# Patient Record
Sex: FEMALE | Race: BLACK OR AFRICAN AMERICAN
[De-identification: names, ages, dates, MRNs, and addresses within clinical notes are randomized per-mention and may not be internally consistent; named-entity substitution may affect disease eponyms.]

---

## 2020-02-04 ENCOUNTER — HOSPITAL ENCOUNTER (INPATIENT)
Dept: HOSPITAL 72 - 2E | Age: 50
LOS: 4 days | Discharge: HOME | DRG: 65 | End: 2020-02-08
Payer: COMMERCIAL

## 2020-02-04 VITALS — WEIGHT: 143 LBS | HEIGHT: 61 IN | BODY MASS INDEX: 27 KG/M2

## 2020-02-04 VITALS — DIASTOLIC BLOOD PRESSURE: 101 MMHG | SYSTOLIC BLOOD PRESSURE: 157 MMHG

## 2020-02-04 DIAGNOSIS — M48.02: ICD-10-CM

## 2020-02-04 DIAGNOSIS — I16.1: ICD-10-CM

## 2020-02-04 DIAGNOSIS — I63.9: Primary | ICD-10-CM

## 2020-02-04 DIAGNOSIS — G45.9: ICD-10-CM

## 2020-02-04 DIAGNOSIS — E11.65: ICD-10-CM

## 2020-02-04 DIAGNOSIS — E83.42: ICD-10-CM

## 2020-02-04 DIAGNOSIS — E78.2: ICD-10-CM

## 2020-02-04 DIAGNOSIS — F17.200: ICD-10-CM

## 2020-02-04 DIAGNOSIS — Z79.84: ICD-10-CM

## 2020-02-04 DIAGNOSIS — M50.21: ICD-10-CM

## 2020-02-04 DIAGNOSIS — I10: ICD-10-CM

## 2020-02-04 DIAGNOSIS — Z90.710: ICD-10-CM

## 2020-02-04 PROCEDURE — 36415 COLL VENOUS BLD VENIPUNCTURE: CPT

## 2020-02-04 PROCEDURE — 80048 BASIC METABOLIC PNL TOTAL CA: CPT

## 2020-02-04 PROCEDURE — 70551 MRI BRAIN STEM W/O DYE: CPT

## 2020-02-04 PROCEDURE — 70544 MR ANGIOGRAPHY HEAD W/O DYE: CPT

## 2020-02-04 PROCEDURE — 72141 MRI NECK SPINE W/O DYE: CPT

## 2020-02-04 PROCEDURE — 82962 GLUCOSE BLOOD TEST: CPT

## 2020-02-04 PROCEDURE — 70548 MR ANGIOGRAPHY NECK W/DYE: CPT

## 2020-02-04 PROCEDURE — 86140 C-REACTIVE PROTEIN: CPT

## 2020-02-04 PROCEDURE — 84100 ASSAY OF PHOSPHORUS: CPT

## 2020-02-04 PROCEDURE — 84443 ASSAY THYROID STIM HORMONE: CPT

## 2020-02-04 PROCEDURE — 93005 ELECTROCARDIOGRAM TRACING: CPT

## 2020-02-04 PROCEDURE — 83735 ASSAY OF MAGNESIUM: CPT

## 2020-02-04 PROCEDURE — 83036 HEMOGLOBIN GLYCOSYLATED A1C: CPT

## 2020-02-04 PROCEDURE — 80061 LIPID PANEL: CPT

## 2020-02-04 PROCEDURE — 80053 COMPREHEN METABOLIC PANEL: CPT

## 2020-02-04 PROCEDURE — 93306 TTE W/DOPPLER COMPLETE: CPT

## 2020-02-04 PROCEDURE — 85025 COMPLETE CBC W/AUTO DIFF WBC: CPT

## 2020-02-04 PROCEDURE — 85651 RBC SED RATE NONAUTOMATED: CPT

## 2020-02-04 PROCEDURE — 93880 EXTRACRANIAL BILAT STUDY: CPT

## 2020-02-04 PROCEDURE — 84484 ASSAY OF TROPONIN QUANT: CPT

## 2020-02-04 RX ADMIN — LISINOPRIL SCH MG: 20 TABLET ORAL at 09:00

## 2020-02-04 NOTE — NUR
NURSE NOTES:

Administered PRN Tylenol per MD orders, re-assessed the patient, patient no longer c/o 
headache the way she did before. BP also has decreased from previous reading. Patient is 
currently stable, all needs attended to, will continue to monitor.

## 2020-02-04 NOTE — NUR
NURSE NOTES:

Patient arrived to the unit on on a gurney accompanied by EMTs. Report was given by Cindy haddad RN from [facility] ED Department. Patient was able to transfer self from the gurney to the 
bed with no incident. Patient is awake, alert and responsive. Breathing regular and 
unlabored on room air with no s/s of SOB noted at this time. IV access in on the LAC, 20G, 
patent, intact, and saline locked. Patient is c/o of a headache of 5/10 on the scale of 
0-10. Will administer PRN medications after orders have been received by the primary MD. 
Placed patient on cardiac monitor, showing sinus rhythm. Belongings list reviewed and signed 
with the patient. Patient also brought home medications with her, medications taken and put 
away to give to onsite pharmacy in the morning once they arrive. Oriented patient to the 
room, patient is able to ambulate with a steady gait. Placed bed in lowest position, 
side-rails x2, breaks engaged, and call light is within reach at all times. Contacted Dr. Whitney for admitting orders, orders noted and carried out. All other needs attended to, 
patient remains stable, will continue to monitor.

## 2020-02-05 VITALS — SYSTOLIC BLOOD PRESSURE: 145 MMHG | DIASTOLIC BLOOD PRESSURE: 92 MMHG

## 2020-02-05 VITALS — SYSTOLIC BLOOD PRESSURE: 142 MMHG | DIASTOLIC BLOOD PRESSURE: 92 MMHG

## 2020-02-05 VITALS — SYSTOLIC BLOOD PRESSURE: 145 MMHG | DIASTOLIC BLOOD PRESSURE: 99 MMHG

## 2020-02-05 VITALS — DIASTOLIC BLOOD PRESSURE: 92 MMHG | SYSTOLIC BLOOD PRESSURE: 143 MMHG

## 2020-02-05 VITALS — SYSTOLIC BLOOD PRESSURE: 136 MMHG | DIASTOLIC BLOOD PRESSURE: 89 MMHG

## 2020-02-05 VITALS — DIASTOLIC BLOOD PRESSURE: 83 MMHG | SYSTOLIC BLOOD PRESSURE: 138 MMHG

## 2020-02-05 LAB
ADD MANUAL DIFF: NO
ALBUMIN SERPL-MCNC: 3.2 G/DL (ref 3.4–5)
ALBUMIN/GLOB SERPL: 0.9 {RATIO} (ref 1–2.7)
ALP SERPL-CCNC: 77 U/L (ref 46–116)
ALT SERPL-CCNC: 26 U/L (ref 12–78)
ANION GAP SERPL CALC-SCNC: 8 MMOL/L (ref 5–15)
AST SERPL-CCNC: 13 U/L (ref 15–37)
BASOPHILS NFR BLD AUTO: 1.2 % (ref 0–2)
BILIRUB SERPL-MCNC: 0.5 MG/DL (ref 0.2–1)
BUN SERPL-MCNC: 17 MG/DL (ref 7–18)
CALCIUM SERPL-MCNC: 8.6 MG/DL (ref 8.5–10.1)
CHLORIDE SERPL-SCNC: 103 MMOL/L (ref 98–107)
CHOLEST SERPL-MCNC: 213 MG/DL (ref ?–200)
CO2 SERPL-SCNC: 28 MMOL/L (ref 21–32)
CREAT SERPL-MCNC: 0.8 MG/DL (ref 0.55–1.3)
EOSINOPHIL NFR BLD AUTO: 1.5 % (ref 0–3)
ERYTHROCYTE [DISTWIDTH] IN BLOOD BY AUTOMATED COUNT: 11.6 % (ref 11.6–14.8)
GLOBULIN SER-MCNC: 3.7 G/DL
HCT VFR BLD CALC: 34.2 % (ref 37–47)
HDLC SERPL-MCNC: 25 MG/DL (ref 40–60)
HGB BLD-MCNC: 12.6 G/DL (ref 12–16)
LYMPHOCYTES NFR BLD AUTO: 37 % (ref 20–45)
MCV RBC AUTO: 91 FL (ref 80–99)
MONOCYTES NFR BLD AUTO: 7.7 % (ref 1–10)
NEUTROPHILS NFR BLD AUTO: 52.6 % (ref 45–75)
PHOSPHATE SERPL-MCNC: 3.5 MG/DL (ref 2.5–4.9)
PLATELET # BLD: 164 K/UL (ref 150–450)
POTASSIUM SERPL-SCNC: 3.7 MMOL/L (ref 3.5–5.1)
RBC # BLD AUTO: 3.74 M/UL (ref 4.2–5.4)
SODIUM SERPL-SCNC: 139 MMOL/L (ref 136–145)
TRIGL SERPL-MCNC: 840 MG/DL (ref 30–150)
WBC # BLD AUTO: 6.3 K/UL (ref 4.8–10.8)

## 2020-02-05 RX ADMIN — LISINOPRIL SCH MG: 20 TABLET ORAL at 09:16

## 2020-02-05 RX ADMIN — HYDROCHLOROTHIAZIDE SCH MG: 50 TABLET ORAL at 09:15

## 2020-02-05 RX ADMIN — INSULIN ASPART SCH UNITS: 100 INJECTION, SOLUTION INTRAVENOUS; SUBCUTANEOUS at 06:24

## 2020-02-05 RX ADMIN — METFORMIN HYDROCHLORIDE SCH MG: 500 TABLET ORAL at 17:02

## 2020-02-05 RX ADMIN — HEPARIN SODIUM SCH UNITS: 5000 INJECTION INTRAVENOUS; SUBCUTANEOUS at 21:03

## 2020-02-05 RX ADMIN — INSULIN ASPART SCH UNITS: 100 INJECTION, SOLUTION INTRAVENOUS; SUBCUTANEOUS at 13:07

## 2020-02-05 RX ADMIN — DIPHENHYDRAMINE HYDROCHLORIDE PRN MG: 25 CAPSULE ORAL at 21:09

## 2020-02-05 RX ADMIN — HEPARIN SODIUM SCH UNITS: 5000 INJECTION INTRAVENOUS; SUBCUTANEOUS at 09:17

## 2020-02-05 RX ADMIN — INSULIN ASPART SCH UNITS: 100 INJECTION, SOLUTION INTRAVENOUS; SUBCUTANEOUS at 17:06

## 2020-02-05 RX ADMIN — INSULIN ASPART SCH UNITS: 100 INJECTION, SOLUTION INTRAVENOUS; SUBCUTANEOUS at 21:04

## 2020-02-05 RX ADMIN — DIPHENHYDRAMINE HYDROCHLORIDE PRN MG: 25 CAPSULE ORAL at 13:05

## 2020-02-05 RX ADMIN — METFORMIN HYDROCHLORIDE SCH MG: 500 TABLET ORAL at 06:28

## 2020-02-05 NOTE — NUR
SWALLOW/SPEECH THERAPY NOTE:



REFERRED FOR SWALLOW EVALUATION BY DR SMITH, SEE FULL REPORT.



DYSPHAGIA RISK FACTORS FOR THIS 49 Y.O.???? HANDED AA FEMALE:



ACUTE TIA (R/O CVA NEG ON MRI/BRAIN AND CT/HEAD SCANS) WITH LSW (UE 

FINGERS AND TOES) AND BLURRED VISION RESOLVED PER PABLIOT CABEZAS MD, HOSPITAL 

SHE TRANSFERRED FROM. PER PT, HER COWORKERS SAID HER SPEECH CHANGED AND WAS LESS CLEAR BUT 
THIS PROBLME HAS RESOLVED.  



H/O HTN, DIABETES (ON INSULIN), AND SMOKING EVERYDAY.



NO POLST/AD REGARDING ARTIFICIAL NUTRITION IF SHE NEEDS.

AT HOME ON REGULAR TEXTURE AND THIN LIQUID DIET.

NOW ON CARDIAC REGULAR TEXTURE AND THIN LIQUID DIET. PER DAVE PITTMAN, NO 

OVERT S/S OF DYSPHAGIA NOR ASPIRATION WITH THIN LIQUIDS AND SMALL TYLENOL 

PILL. 



PER PATIENT, SHE HAD RIGHT FINGER/HAND TINGLING A MONTH AGO. HER SISTER 

SAID THAT THEIR FAMILY HAS A H/O STROKES.  PATIENT DENIES ANY SPEECH, 

SWALLOWING, NOR SIGNIFICANT COGNITIVE-LANGUAGE PROBLEMS. SHE DID SAY HER LEFT LE STILL 

FEELS WEAK (SHE SAW PT). GOOD INTAKE W/O OVERT ASP ON REGULAR TEXTURE AND 

THIN LIQUIDS PER STAFF.  



INITIAL IMPRESSIONS:

GROSSLY FUNCTIONAL OROMOTOR SKILLS AND SWALLOWING SKILLS WITH ALL 

CONSISTENCIES OF THIN LIQUIDS VIA STRAW SEQUENTIAL SIPS OF 3 OZ WATER, 

PUREED TSP, AND MASTICATED SOLIDS (1/2 CRACKER).



NO OVERT S/S OF ASPIRATION. 



QUESTIONABLE RISK FOR SILENT ASPIRATION (PER CT HEAD AND MRI BRAIN 

NEGATIVE THOUGH PT STILL HAS SOME WEAKNESS ON HER LEFT LE OR FOOT).



LUNGS ARE CLEAR NOW. 



RECOMMENDATIONS:

CONSIDER CONTINUING WITH CURRENT REGULAR TEXTURE DIET AND THIN LIQUIDS W/O 

NEED FOR SPECIFIC ASPIRATION PRECAUTIONS.



UNLIKELY NEED FOR MODIFIED BARIUM SWALLOW STUDY AT THIS TIME.



DR COKER, NEUROLOGIST TO SEE PATIENT TOMORROW.



GAVE PATIENT AND HER SISTER SOME WRITTEN AND GENERAL INFORMATION ON STROKE FOR FUTURE 
REFERENCES.



WILL D/C FROM SKILLED SLP AT THIS TIME AS PATIENT HAS GROSSLY FUNCTIONAL SPEECH, SWALLOWING, 
AND COGNITIVE-LINGUISTIC SKILLS AT THIS TIME.



SLP LEFT HER CARD WITH PATIENT SHOULD SHE REQUIRE FURTHER ASSESSMENT AS AN OUTPATIENT AND 
PRIOR TO RETURNING TO WORK.

## 2020-02-05 NOTE — NUR
NURSE NOTES:

Received report TOYA Mcgrath. Pt in bed, awake, talkative, c/o HA, RN will administer 
Tylenol, no respiratory distress noted, discussed plan of care for pt and pending 2D echo 
and Dr. Salas to see pt today, IV site L AC 20g SL, bed in lowest position, call light within 
reach.

## 2020-02-05 NOTE — NUR
NURSE NOTES:

Performed am assessment with pt, Pt states she has HA 5/10 on Left side of head and tingling 
down right arm, RN performed neuro assessment with pt, A/Ox4,  pupils KAMRAN, no deficits 
between left and right arms and hands, slight deficit between left and right lower 
extremity. Left foot strength 3/5, right foot is 5/5. RN left message for Dr. Salas

-------------------------------------------------------------------------------

Addendum: 02/05/20 at 0810 by DAVE BULL RN

-------------------------------------------------------------------------------

NURSE NOTES:

RN also notified Dr. Whitney

-------------------------------------------------------------------------------

Addendum: 02/05/20 at 1037 by DAVE BULL RN

-------------------------------------------------------------------------------

NURSE NOTES:

Discussed with Tavia Uribe, NP assessment finding including tingling and pt ACOSTA, Tavia stated 
she noted a deficit in her strength in hands at this time, NP ordered carotid duplex and 
contacted Dr. Salas for a neuro consult

## 2020-02-05 NOTE — DIAGNOSTIC IMAGING REPORT
Indication: TIA

 

TECHNIQUE: Duplex extracranial carotid and vertebral artery sonography performed with

color flow imaging and waveform analysis.

 

COMPARISON: None

 

FINDINGS:

 

Right carotid:

 

Grayscale and color-flow imaging demonstrating no hemodynamically significant

stenosis within the common carotid artery, extracranial internal carotid artery. Peak

systolic and end-diastolic velocities are within normal limits. ICA/CCA ratios are

within normal limits.  Mild heterogeneous plaques are demonstrated consistent with

atherosclerotic disease.

 

Left carotid:

 

Grayscale and color-flow imaging demonstrating no hemodynamically significant

stenosis within the common carotid artery, extracranial internal carotid artery. Peak

systolic and end-diastolic velocities are within normal limits. ICA/CCA ratios are

within normal limits. Mild heterogeneous plaques are demonstrated consistent with

atherosclerotic disease.

 

Vertebral arteries:

 

Antegrade flow demonstrated within both vertebral arteries.

 

IMPRESSION:

 

No hemodynamically significant extracranial carotid artery stenosis identified.

 

Antegrade flow within both vertebral arteries.

 

This report utilizes carotid stenosis grading criteria based on the meeting of

Society of radiologists in ultrasound consensus conference, October 2002.

## 2020-02-05 NOTE — NUR
NURSE NOTES:

Received report from TOYA Sesay. Patient is in bed, awake, alert, and responsive. Breathing 
regular and unlabored with no s/s of SOB noted at this time. Patient shows no signs of 
facial drooping, however the right side is stronger in strength than the left. Neurologist 
is on the case and primary MD is aware of findings. Patient is no longer c/o a headache, 
just stating that "My left side of the head feels a bit sore." No numbness or tingling 
noted. IV access is on the LAC, 20G, patent, intact, and saline locked. Bed is in lowest 
position, breaks engaged, and call light is within reach at all times. All other needs 
attended to, patient remains stable, will continue to monitor.

## 2020-02-05 NOTE — NUR
NURSE NOTES:

eMAR showing past due Lisinopril for 2/4/2020 0900, pt was not admitted to [facility] until 
2/4/2020 2200, RN non-administered medication as it was an error to show past due 
medication. Will administer 2/5/20 0900 dose based on pt's current BP

## 2020-02-05 NOTE — CONSULTATION
History of Present Illness


General


Date patient seen:  Feb 5, 2020


Time patient seen:  11:03


Chief Complaint:  left side weakness


Referring physician:  dr Whitney


Reason for Consultation:  critical care consult/hospitalist





Present Illness


HPI


49 y/old female with PMH of HTN, DM< HLD initially presented to [facility] 

with sudden onset of left sided numbness around 1 pm at 2/4/20. 


 





BP was severely elevated at  197/129-> 194/103-> 170/99. 


Patient subsequently developed headache and had a blurred vision in the left 

eye. These symptoms currently resolved. 


No CP, no SOB


CT of the head and MRI of the brain failed to revealed acute intracranial 

pathology. 


Patient was transferred to [facility] per insurance purposes.


Patient reported today RUE tingling and discomfort  at the neck area. 


Yesterday   numbness was on LUE  


She denied dysphagia, drooling, voice change. 


She reports smoking about 1 pk/week, no ETOH or illicit drug use. 


Troponin negative. ECG revealed no acute ischemic changes.


BP improved.


Lipid panel revealed elevated TC >200 and severely elevated TG >800 


Mg-1.6


Allergies:  


Coded Allergies:  


     No Known Allergies (Unverified , 2/4/20)





Medication History


Scheduled


Lisinopril/Hydrochlorothiazide 20-25 Mg Tab (Lisinopril-Hctz 20-25 Mg Tab), 1 

TAB ORAL DAILY, (Reported)


Metformin Hcl* (Metformin Hcl*), 1,000 MG ORAL DAILY, (Reported)


Pravastatin Sod* (Pravastatin Sod*), 20 MG ORAL BEDTIME, (Reported)





Patient History


History Provided By:  Patient


Healthcare decision maker





Resuscitation status


Full Code


Advanced Directive on File








Past Medical/Surgical History


Past Medical/Surgical History:  


(1) Hyperlipidemia


(2) DM (diabetes mellitus)


(3) Hypertension





Review of Systems


Constitutional:  Reports: no symptoms


Respiratory:  Reports: no symptoms


Cardiovascular:  Reports: no symptoms


Gastrointestinal:  Reports: no symptoms


Genitourinary:  Reports: no symptoms


Musculoskeletal:  Reports: see HPI


Skin:  Reports: no symptoms


Psychiatric:  Reports: no symptoms


Neurological:  Reports: see HPI


Endocrine:  Reports: other - DM


Hematologic/Lymphatic:  Reports: no symptoms





Physical Exam


General Appearance:  WD/WN, no apparent distress, alert


Lines, tubes and drains:  peripheral


HEENT:  normocephalic, atraumatic, anicteric, mucous membranes moist


Neck:  supple


Respiratory/Chest:  lungs clear, no respiratory distress, no accessory muscle 

use


Cardiovascular/Chest:  normal peripheral pulses, normal rate, regular rhythm


Abdomen:  normal bowel sounds, non tender, soft


Extremities:  no calf tenderness, normal capillary refill


Skin Exam:  warm/dry


Neurologic:  alert, normal mood/affect - L pronator drift, other - L sided 

motor strength 3/5, R sided 5/5, L pronator drift


Musculoskeletal:  normal muscle bulk





Last 24 Hour Vital Signs








  Date Time  Temp Pulse Resp B/P (MAP) Pulse Ox O2 Delivery O2 Flow Rate FiO2


 


2/5/20 12:00 97.9 74 16 145/92 (109) 96   


 


2/5/20 09:16    145/99    


 


2/5/20 08:19      Room Air  


 


2/5/20 08:13 98.2       


 


2/5/20 08:00 98.2 80 16 145/99 (114) 96   


 


2/5/20 07:43  80      


 


2/5/20 04:00  77      


 


2/5/20 04:00 97.7 80 16 138/83 (101) 97   


 


2/5/20 00:00  74      


 


2/5/20 00:00 97.5 77 16 142/92 (109) 98   


 


2/4/20 22:50      Room Air  


 


2/4/20 22:20  81      


 


2/4/20 22:15 97.7 83 16 157/101 (119) 96   

















Intake and Output  


 


 2/4/20 2/5/20





 19:00 07:00


 


Output Total  1 ml


 


Balance  -1 ml


 


  


 


Output Urine Total  1 ml











Laboratory Tests








Test


  2/5/20


05:35


 


White Blood Count


  6.3 K/UL


(4.8-10.8)


 


Red Blood Count


  3.74 M/UL


(4.20-5.40)  L


 


Hemoglobin


  12.6 G/DL


(12.0-16.0)


 


Hematocrit


  34.2 %


(37.0-47.0)  L


 


Mean Corpuscular Volume 91 FL (80-99)  


 


Mean Corpuscular Hemoglobin


  33.7 PG


(27.0-31.0)  H


 


Mean Corpuscular Hemoglobin


Concent 36.9 G/DL


(32.0-36.0)  H


 


Red Cell Distribution Width


  11.6 %


(11.6-14.8)


 


Platelet Count


  164 K/UL


(150-450)


 


Mean Platelet Volume


  10.4 FL


(6.5-10.1)  H


 


Neutrophils (%) (Auto)


  52.6 %


(45.0-75.0)


 


Lymphocytes (%) (Auto)


  37.0 %


(20.0-45.0)


 


Monocytes (%) (Auto)


  7.7 %


(1.0-10.0)


 


Eosinophils (%) (Auto)


  1.5 %


(0.0-3.0)


 


Basophils (%) (Auto)


  1.2 %


(0.0-2.0)


 


Sodium Level


  139 MMOL/L


(136-145)


 


Potassium Level


  3.7 MMOL/L


(3.5-5.1)


 


Chloride Level


  103 MMOL/L


()


 


Carbon Dioxide Level


  28 MMOL/L


(21-32)


 


Anion Gap


  8 mmol/L


(5-15)


 


Blood Urea Nitrogen


  17 mg/dL


(7-18)


 


Creatinine


  0.8 MG/DL


(0.55-1.30)


 


Estimat Glomerular Filtration


Rate > 60 mL/min


(>60)


 


Glucose Level


  283 MG/DL


()  H


 


Calcium Level


  8.6 MG/DL


(8.5-10.1)


 


Phosphorus Level


  3.5 MG/DL


(2.5-4.9)


 


Magnesium Level


  1.6 MG/DL


(1.8-2.4)  L


 


Total Bilirubin


  0.5 MG/DL


(0.2-1.0)


 


Aspartate Amino Transf


(AST/SGOT) 13 U/L (15-37)


L


 


Alanine Aminotransferase


(ALT/SGPT) 26 U/L (12-78)


 


 


Alkaline Phosphatase


  77 U/L


()


 


Troponin I


  0.000 ng/mL


(0.000-0.056)


 


Total Protein


  6.9 G/DL


(6.4-8.2)


 


Albumin


  3.2 G/DL


(3.4-5.0)  L


 


Globulin 3.7 g/dL  


 


Albumin/Globulin Ratio


  0.9 (1.0-2.7)


L


 


Triglycerides Level


  840 MG/DL


()  H


 


Cholesterol Level


  213 MG/DL (<


200)  H


 


LDL Cholesterol


  67 mg/dL


(<100)


 


HDL Cholesterol


  25 MG/DL


(40-60)  L


 


Cholesterol/HDL Ratio


  8.5 (3.3-4.4)


H








Height (Feet):  5


Height (Inches):  1.00


Weight (Pounds):  143


Medications





Current Medications








 Medications


  (Trade)  Dose


 Ordered  Sig/Darius


 Route


 PRN Reason  Start Time


 Stop Time Status Last Admin


Dose Admin


 


 Acetaminophen


  (Tylenol)  650 mg  Q6H  PRN


 ORAL


 Mild Pain/Temp > 100.5  2/4/20 23:45


 3/5/20 23:44  2/5/20 07:43


 


 


 Aspirin


  (ASA)  325 mg  DAILY


 ORAL


   2/5/20 09:00


 3/6/20 08:59  2/5/20 09:15


 


 


 Clonidine HCl


  (Catapres Tab)  0.1 mg  Q4H  PRN


 ORAL


 SBP Greater than 160  2/5/20 00:00


 3/6/20 00:00   


 


 


 Dextrose


  (Dextrose 50%)  25 ml  Q30M  PRN


 IV


 Hypoglycemia  2/4/20 23:45


 3/5/20 23:44   


 


 


 Dextrose


  (Dextrose 50%)  50 ml  Q30M  PRN


 IV


 Hypoglycemia  2/4/20 23:45


 3/5/20 23:44   


 


 


 Diphenhydramine


 HCl


  (Benadryl)  25 mg  Q6H  PRN


 ORAL


 Itching  2/5/20 12:00


 3/6/20 11:59   


 


 


 Heparin Sodium


  (Porcine)


  (Heparin 5000


 units/ml)  5,000 units  EVERY 12  HOURS


 SUBQ


   2/5/20 09:00


 3/6/20 08:59  2/5/20 09:17


 


 


 Hydrochlorothiazide


  (Hydrodiuril)  25 mg  DAILY


 ORAL


   2/5/20 09:00


 3/6/20 08:59  2/5/20 09:15


 


 


 Insulin Aspart


  (NovoLOG)    BEFORE MEALS AND  HS


 SUBQ


   2/5/20 06:30


 3/6/20 06:29  2/5/20 06:24


 


 


 Lisinopril


  (PriniviL)  20 mg  DAILY


 ORAL


   2/4/20 09:00


 3/5/20 08:59  2/5/20 09:16


 


 


 Lorazepam


  (Ativan 2mg/ml


 1ml)  0.5 mg  ONCE  PRN


 IV


 prior to MRI  2/5/20 12:00


 2/5/20 18:00   


 


 


 Metformin HCl


  (Glucophage)  500 mg  BID@0630,1630


 ORAL


   2/5/20 06:30


 3/6/20 06:29  2/5/20 06:28


 


 


 Ondansetron HCl


  (Zofran)  4 mg  Q4H  PRN


 IVP


 Nausea & Vomiting  2/4/20 23:45


 3/5/20 23:44   


 


 


 Pravastatin Sodium


  (Pravachol)  20 mg  BEDTIME


 ORAL


   2/5/20 21:00


 3/6/20 20:59   


 











Assessment/Plan


Assessment/Plan:


ASSESSMENT


1. L sided weakness , likely  CVA  


2. HTN with initial HTN urgency 


3. DM


4. Mixed HLD with severely elevated TG 


5. Smoker    


6. Hypomagnesemia 





PLAN OF CARE 


tele 


CT head and MRI negative  ( done in [place]) 


neuro eval


carotid Duplex 


lipid panel with severely elevated TG>800, start  pt on TriCor, monitor LFT 





ASA, statin


BP management with ACE 


BS management with metformin and SSI as needed;   check HgA1c


educated on low fat low cholesterol, LOW FAT  diet 


  on smoking cessation, declined Nicotine patch    


replace Mg , check level in am 





case discussed and evaluated by supervising physician











Tavia Uribe NP Feb 5, 2020 13:09

## 2020-02-05 NOTE — HISTORY AND PHYSICAL REPORT
DATE OF ADMISSION:  2020

CHIEF COMPLAINT:  The patient is a 49-year-old  female, who

presents with a chief complaint of left-sided weakness and high blood

pressure.



HISTORY OF PRESENT ILLNESS:  The patient has a history of diabetes and

hypertension.  The patient states she was on her way to work yesterday,

approximately noon.  The patient began to experience numbness of her left

fingers and toes.  The patient works at the skilled nursing facility.  The

patient states her blood pressure at the skilled nursing facility was in

the 200/115.  The patient then began to experience a headache.  The

patient also started to have blurred vision in the left eye.



The patient initially presented to [facility] emergency room.  An

initial CT scan of the brain was reported as no evidence of intracranial

abnormalities.  The patient is transferred to [facility] for

insurance purposes.  The patient is admitted with left-sided numbness to

rule out acute cerebrovascular accident.  The patient is also admitted

with hypertensive emergency.



REVIEW OF SYSTEMS:  CONSTITUTIONAL:  The patient denies weight loss or

weight gain.  The patient denies fevers or chills.    HEENT:  The patient

complains of headache as above.  The patient denies ear or throat pain.

CHEST:  The patient denies wheeze or shortness of breath.  CARDIOVASCULAR:

The patient denies palpitations or chest pain.  ABDOMEN:  The patient

denies nausea, vomiting, diarrhea, or constipation.  GENITOURINARY:  The

patient denies dysuria or increased frequency urination.  NEUROMUSCULAR:

The patient complains of numbness of the left hand and left toes as above.

The patient denies seizures or generalized weakness.



PAST MEDICAL HISTORY:  Significant for:



1. Type 2 diabetes.

2. Hypertension.

3. Hypercholesterolemia.



PAST SURGICAL HISTORY:  Significant for:



1. Total abdominal hysterectomy.

2.  section x2.



CURRENT MEDICATIONS:

1. Metformin 1000 mg p.o. twice daily.

2. Lisinopril/hydrochlorothiazide 20/25 one tablet p.o. daily.

3. Pravastatin 20 mg p.o. daily.



ALLERGIES:  No known drug allergies.



SOCIAL HISTORY:  The patient is single and lives alone.  The patient admits

to tobacco use of one-quarter pack per day.  The patient denies alcohol

use.



PHYSICAL EXAMINATION:

VITAL SIGNS:  Temperature 98.6, respirations 14, pulse 78, blood pressure

170/99 to 194/103.

GENERAL:  The patient is well-developed, well-nourished, 

female, in no apparent distress.

HEENT:  Eyes, pupils equal and responsive to light and accommodation.

Extraocular movements are intact.

NECK:  Supple without lymphadenopathy.

CHEST:  Lungs are clear to auscultation bilaterally without wheezes or

rales.

CARDIOVASCULAR:  Regular rate S1, S2 normal without murmurs, rubs, or

gallops.

ABDOMEN:  Soft, nontender, and nondistended.  Positive bowel sounds.  No

evidence of hepatosplenomegaly.  Currently, no rebound or guarding

noted.

EXTREMITIES:  Negative for clubbing, cyanosis, or edema.

RECTAL/GENITAL:  Not performed.

NEUROLOGIC:  Cranial nerves II through XII are grossly intact without focal

deficits.  Motor strength is 5/5 bilaterally.  Deep tendon reflexes are 2+

plantar.



LABORATORY STUDIES:  WBC 6.9, hemoglobin 13.1, hematocrit 39.1, platelets

183,000.  Sodium 139, potassium 4.3, chloride 104, CO2 27, BUN 11,

creatinine 0.51, glucose 212.  A CT of the brain revealed no acute

intracranial abnormalities.



ASSESSMENT:  This is a 49-year-old  female:



1. Left-sided numbness.

2. Hypertensive emergency.

3. Headache.

4. Blurred vision.

5. Diabetes type 2.

6. Hypertension.

7. Hypercholesterolemia.



TREATMENT:

1. Left-sided numbness/headache/blurred vision.  Initial CAT scan was

reported as no acute intracranial abnormalities.  An MRI of the brain is

pending.  Carotid duplex Dopplers are pending.  Neurology consultation has

been obtained with Dr. Jose Ramírez.  We will follow recommendations of

Neurology.  Differential includes acute cerebrovascular accident versus

transient ischemic attack.

2. Hypertensive emergency.  The patient has been placed on lisinopril

and hydrochlorothiazide as above.  Clonidine will be used p.r.n. for

systolic greater than 150, diastolic greater than 100.

3. Diabetes type 2.  Continue metformin as above.  NovoLog sliding scale

has been instituted for coverage.

4. Hypercholesterolemia.  Continue atorvastatin as above.









  ______________________________________________

  Phi Salas M.D. DR:  MAXI/RENAE

D:  2020 12:01

T:  2020 02:25

JOB#:  [id number]

CC:

## 2020-02-05 NOTE — NUR
CASE MANAGEMENT:REVIEW



49 YR OLD FEMALE TRANSFERRED FROM [place] TO [place]



CC: LEFT SIDED WEAKNESS



SI: PROBABLE CVA

97.7   83  16  157/101  96% ON RA

GLUCOSE+283    MAG-1.6  



IS: TRICOR PO QD

IV MAG X1

HEPARIN SQ Q12

ASA PO

HCTZ PO QD 

LISINOPRIL 

**: DIRECTLY ADMITTED TO TELEMETRY 



PLAN:

2DECHO

ST EVAL

EKG

CAROTID DUPLEX

NEURO CONSULT ~ DR COKER

## 2020-02-05 NOTE — HISTORY & PHYSICAL
History and Physical


History & Physicial


Dictated for Int Med-Dr Whitney no. [id number].











Phi Salas MD Feb 5, 2020 12:01

## 2020-02-06 VITALS — SYSTOLIC BLOOD PRESSURE: 140 MMHG | DIASTOLIC BLOOD PRESSURE: 89 MMHG

## 2020-02-06 VITALS — DIASTOLIC BLOOD PRESSURE: 95 MMHG | SYSTOLIC BLOOD PRESSURE: 144 MMHG

## 2020-02-06 VITALS — DIASTOLIC BLOOD PRESSURE: 78 MMHG | SYSTOLIC BLOOD PRESSURE: 126 MMHG

## 2020-02-06 VITALS — SYSTOLIC BLOOD PRESSURE: 150 MMHG | DIASTOLIC BLOOD PRESSURE: 94 MMHG

## 2020-02-06 VITALS — SYSTOLIC BLOOD PRESSURE: 145 MMHG | DIASTOLIC BLOOD PRESSURE: 92 MMHG

## 2020-02-06 VITALS — SYSTOLIC BLOOD PRESSURE: 140 MMHG | DIASTOLIC BLOOD PRESSURE: 93 MMHG

## 2020-02-06 LAB
ADD MANUAL DIFF: NO
ANION GAP SERPL CALC-SCNC: 6 MMOL/L (ref 5–15)
ANION GAP SERPL CALC-SCNC: 8 MMOL/L (ref 5–15)
BASOPHILS NFR BLD AUTO: 1.8 % (ref 0–2)
BUN SERPL-MCNC: 16 MG/DL (ref 7–18)
BUN SERPL-MCNC: 17 MG/DL (ref 7–18)
CALCIUM SERPL-MCNC: 8.7 MG/DL (ref 8.5–10.1)
CALCIUM SERPL-MCNC: 9.2 MG/DL (ref 8.5–10.1)
CHLORIDE SERPL-SCNC: 100 MMOL/L (ref 98–107)
CHLORIDE SERPL-SCNC: 97 MMOL/L (ref 98–107)
CO2 SERPL-SCNC: 30 MMOL/L (ref 21–32)
CO2 SERPL-SCNC: 31 MMOL/L (ref 21–32)
CREAT SERPL-MCNC: 0.7 MG/DL (ref 0.55–1.3)
CREAT SERPL-MCNC: 0.8 MG/DL (ref 0.55–1.3)
EOSINOPHIL NFR BLD AUTO: 1.2 % (ref 0–3)
ERYTHROCYTE [DISTWIDTH] IN BLOOD BY AUTOMATED COUNT: 11.5 % (ref 11.6–14.8)
HCT VFR BLD CALC: 37.5 % (ref 37–47)
HGB BLD-MCNC: 14.4 G/DL (ref 12–16)
LYMPHOCYTES NFR BLD AUTO: 40.6 % (ref 20–45)
MCV RBC AUTO: 91 FL (ref 80–99)
MONOCYTES NFR BLD AUTO: 7.1 % (ref 1–10)
NEUTROPHILS NFR BLD AUTO: 49.3 % (ref 45–75)
PLATELET # BLD: 205 K/UL (ref 150–450)
POTASSIUM SERPL-SCNC: 4 MMOL/L (ref 3.5–5.1)
POTASSIUM SERPL-SCNC: 4.4 MMOL/L (ref 3.5–5.1)
RBC # BLD AUTO: 4.1 M/UL (ref 4.2–5.4)
SODIUM SERPL-SCNC: 136 MMOL/L (ref 136–145)
SODIUM SERPL-SCNC: 136 MMOL/L (ref 136–145)
WBC # BLD AUTO: 5.9 K/UL (ref 4.8–10.8)

## 2020-02-06 RX ADMIN — INSULIN ASPART SCH UNITS: 100 INJECTION, SOLUTION INTRAVENOUS; SUBCUTANEOUS at 11:28

## 2020-02-06 RX ADMIN — HYDROCHLOROTHIAZIDE SCH MG: 50 TABLET ORAL at 08:26

## 2020-02-06 RX ADMIN — METFORMIN HYDROCHLORIDE SCH MG: 500 TABLET ORAL at 06:14

## 2020-02-06 RX ADMIN — INSULIN ASPART SCH UNITS: 100 INJECTION, SOLUTION INTRAVENOUS; SUBCUTANEOUS at 17:04

## 2020-02-06 RX ADMIN — LISINOPRIL SCH MG: 20 TABLET ORAL at 08:27

## 2020-02-06 RX ADMIN — METFORMIN HYDROCHLORIDE SCH MG: 500 TABLET ORAL at 17:02

## 2020-02-06 RX ADMIN — HEPARIN SODIUM SCH UNITS: 5000 INJECTION INTRAVENOUS; SUBCUTANEOUS at 20:31

## 2020-02-06 RX ADMIN — HEPARIN SODIUM SCH UNITS: 5000 INJECTION INTRAVENOUS; SUBCUTANEOUS at 08:31

## 2020-02-06 RX ADMIN — INSULIN ASPART SCH UNITS: 100 INJECTION, SOLUTION INTRAVENOUS; SUBCUTANEOUS at 06:15

## 2020-02-06 RX ADMIN — DIPHENHYDRAMINE HYDROCHLORIDE PRN MG: 25 CAPSULE ORAL at 20:31

## 2020-02-06 RX ADMIN — INSULIN ASPART SCH UNITS: 100 INJECTION, SOLUTION INTRAVENOUS; SUBCUTANEOUS at 20:30

## 2020-02-06 NOTE — NUR
*****INSURANCE

PER B/AR INFORMATION

PATIENT HAS BEEN APPROVED FOR 2 DAY STAY

IF PATIENT IS HERE LONGER THAN 2 DAYS THEN CLINICALS NEED TO BE FAXED TO



F: [phone]

T:[phone]

REF # [id number]

## 2020-02-06 NOTE — NUR
2/6/20...Concerning Cervical MRI, Pt attempts to do another MRI exam after she had just 
completed the MRI Brain exam. However, pt refused to start exam at this time due to 
claustrophobia. Although pt was offered Ativan p.o., and I offered to stay overtime if 
needed, Pt still did not wish to perform scan. 

Pt is willing to try again tomorrow. RN Afsoon is aware. lu 15:17

## 2020-02-06 NOTE — DIAGNOSTIC IMAGING REPORT
Indication: Blurred vision. Numbness in the left hand. Hypertension

 

Technique: The head was imaged in a 1.5 Mary Alice magnet. Sequences obtained include

sagittal and axial T1 FLAIR, axial T2 fast spin echo with fat saturation, axial T2*

GRE, axial T2 FLAIR, diffusion and ADC map.

 

Comparison: None

 

Findings: 

 

The size, contour, and configuration of the sulci, ventricles, and basal cisterns

appear normal. Gray-white differentiation is normal. In the periventricular white

matter, there are small areas of mostly confluent T2 hyperintense signal. Findings

are nonspecific.

 

 There is no restricted diffusion. There is no mass effect, midline shift, edema, or

hemorrhage. There are no abnormal extra-axial or intra-axial fluid collections. 

 

The corpus callosum is unremarkable. The brainstem and cerebellum are unremarkable.

The sella is unremarkable. Bone marrow signal within the visualized osseous

structures appears age appropriate and unremarkable otherwise.

 

 

Impression: Periventricular T2 hyperintense signal. Findings are nonspecific. At this

age considerations include chronic small vessel disease which may be associated with

hypertension. Demyelinating disease is not excludable. Clinical correlation is

needed.

## 2020-02-06 NOTE — NUR
NURSE NOTES:



Received report from TOYA Amador. Patient is awake laying, watching TV. There are no signs of 
distress or pain. AO x4. Ambulates independently. Checked IV site, patent and flushed. No 
erythema, bleeding or infiltration noted. Bed in the lowest position with brakes and 
siderails up x2. Call light within reach. Will continue plan of care.

## 2020-02-06 NOTE — NUR
CASE MANAGEMENT:REVIEW



2/6/20

SI: HYPERTENSION. DM

POSSIBLE CVA. BLURRED VISION. SLURRED SPEECH

98.1   80  19  144/95  98% ON RA

GLUCOSE+298    A1C+9.5  MAG-1.7



IS: TRICOR PO QD

HEPARIN SQ Q12

ASA PO QD

HCTZ PO QD

SS INSULIN AC+HS

METFORMIN PO BID   

LISINOPRIL PO QD

**: TELEMETRY STATUS

DCP: HOME

## 2020-02-06 NOTE — PULMONOLOGY PROGRESS NOTE
Assessment/Plan


Problems:  


(1) Cerebral vascular accident


(2) Hypertension


(3) DM (diabetes mellitus)


Assessment/Plan


neuro evaluation requested


Echo and US of carotid artery pending


monitor BP


sliding scale


diabetic diet





Subjective


ROS Limited/Unobtainable:  No


Constitutional:  Reports: no symptoms


HEENT:  Repors: no symptoms


Respiratory:  Reports: no symptoms


Allergies:  


Coded Allergies:  


     No Known Allergies (Unverified , 2/4/20)





Objective





Last 24 Hour Vital Signs








  Date Time  Temp Pulse Resp B/P (MAP) Pulse Ox O2 Delivery O2 Flow Rate FiO2


 


2/6/20 12:00 98.7 61 18 140/93 (109) 96   


 


2/6/20 11:46  80      


 


2/6/20 09:00      Room Air  


 


2/6/20 08:27    144/95    


 


2/6/20 08:00 98.1 80 19 144/95 (111) 98   


 


2/6/20 07:50  82      


 


2/6/20 04:00 97.5 86 20 126/78 (94) 95   


 


2/6/20 04:00  68      


 


2/6/20 00:00 97.7 80 20 145/92 (109) 95   


 


2/6/20 00:00  81      


 


2/5/20 21:00      Room Air  


 


2/5/20 20:00 97.7 86 20 143/92 (109) 95   


 


2/5/20 20:00  83      


 


2/5/20 16:30  89      


 


2/5/20 15:25 98.2 83 16 136/89 (105) 96   

















Intake and Output  


 


 2/5/20 2/6/20





 19:00 07:00


 


Intake Total 660 ml 360 ml


 


Output Total  2 ml


 


Balance 660 ml 358 ml


 


  


 


Intake Oral 660 ml 360 ml


 


Output Urine Total  2 ml


 


# Voids 2 6








General Appearance:  WD/WN


HEENT:  normocephalic, atraumatic


Respiratory/Chest:  chest wall non-tender, lungs clear, normal breath sounds


Cardiovascular:  normal peripheral pulses, normal rate


Abdomen:  normal bowel sounds, soft, non tender


Extremities:  no cyanosis, no clubbing


Skin:  no ulcers


Neurologic/Psychiatric:  CNs II-XII grossly normal, no motor/sensory deficits


Laboratory Tests


2/6/20 05:34: 


White Blood Count 5.9, Red Blood Count 4.10L, Hemoglobin 14.4, Hematocrit 37.5, 

Mean Corpuscular Volume 91, Mean Corpuscular Hemoglobin 35.1H, Mean Corpuscular 

Hemoglobin Concent 38.4H, Red Cell Distribution Width 11.5L, Platelet Count 205

, Mean Platelet Volume 12.1H, Neutrophils (%) (Auto) 49.3, Lymphocytes (%) (Auto

) 40.6, Monocytes (%) (Auto) 7.1, Eosinophils (%) (Auto) 1.2, Basophils (%) (

Auto) 1.8, Sodium Level 136, Potassium Level 4.4, Chloride Level 100, Carbon 

Dioxide Level 30, Anion Gap 6, Blood Urea Nitrogen 17, Creatinine 0.8, Estimat 

Glomerular Filtration Rate > 60, Glucose Level 298H, Hemoglobin A1c 9.5H, 

Calcium Level 8.7, Magnesium Level 1.7L, Thyroid Stimulating Hormone (TSH) 1.815





Current Medications








 Medications


  (Trade)  Dose


 Ordered  Sig/Darius


 Route


 PRN Reason  Start Time


 Stop Time Status Last Admin


Dose Admin


 


 Acetaminophen


  (Tylenol)  650 mg  Q6H  PRN


 ORAL


 Mild Pain/Temp > 100.5  2/4/20 23:45


 3/5/20 23:44  2/5/20 07:43


 


 


 Aspirin


  (ASA)  325 mg  DAILY


 ORAL


   2/5/20 09:00


 3/6/20 08:59  2/6/20 08:27


 


 


 Clonidine HCl


  (Catapres Tab)  0.1 mg  Q4H  PRN


 ORAL


 SBP Greater than 160  2/5/20 00:00


 3/6/20 00:00   


 


 


 Dextrose


  (Dextrose 50%)  25 ml  Q30M  PRN


 IV


 Hypoglycemia  2/4/20 23:45


 3/5/20 23:44   


 


 


 Dextrose


  (Dextrose 50%)  50 ml  Q30M  PRN


 IV


 Hypoglycemia  2/4/20 23:45


 3/5/20 23:44   


 


 


 Diphenhydramine


 HCl


  (Benadryl)  25 mg  Q6H  PRN


 ORAL


 Itching  2/5/20 12:00


 3/6/20 11:59  2/5/20 21:09


 


 


 Fenofibrate


  (Tricor)  145 mg  DAILY


 ORAL


   2/6/20 09:00


 3/7/20 08:59  2/6/20 08:26


 


 


 Heparin Sodium


  (Porcine)


  (Heparin 5000


 units/ml)  5,000 units  EVERY 12  HOURS


 SUBQ


   2/5/20 09:00


 3/6/20 08:59  2/6/20 08:31


 


 


 Hydrochlorothiazide


  (Hydrodiuril)  25 mg  DAILY


 ORAL


   2/5/20 09:00


 3/6/20 08:59  2/6/20 08:26


 


 


 Insulin Aspart


  (NovoLOG)    BEFORE MEALS AND  HS


 SUBQ


   2/5/20 06:30


 3/6/20 06:29  2/6/20 11:28


 


 


 Lisinopril


  (PriniviL)  20 mg  DAILY


 ORAL


   2/4/20 09:00


 3/5/20 08:59  2/6/20 08:27


 


 


 Metformin HCl


  (Glucophage)  500 mg  BID@0630,1630


 ORAL


   2/5/20 06:30


 3/6/20 06:29  2/6/20 06:14


 


 


 Ondansetron HCl


  (Zofran)  4 mg  Q4H  PRN


 IVP


 Nausea & Vomiting  2/4/20 23:45


 3/5/20 23:44   


 


 


 Pravastatin Sodium


  (Pravachol)  20 mg  BEDTIME


 ORAL


   2/5/20 21:00


 3/6/20 20:59  2/5/20 21:02


 

















Wiliam Lo MD Feb 6, 2020 13:07

## 2020-02-06 NOTE — NUR
NURSE NOTES:

Received pt from [facility], Pt is awake and alert, pt is in RA, no SOB or acute respiratory 
distress noted. pt has intact iv access LAC 20G SL. Pt is eating breakfast with observation, 
all needs attended, bed is locked and is in the lowest position, call light within easy 
reach. will continue to monitor.

## 2020-02-06 NOTE — INTERNAL MED PROGRESS NOTE
Subjective


Date of Service:  Feb 6, 2020


Physician Name


Phi Salas


Attending Physician


Elmer Whitney MD





Current Medications








 Medications


  (Trade)  Dose


 Ordered  Sig/Darius


 Route


 PRN Reason  Start Time


 Stop Time Status Last Admin


Dose Admin


 


 Acetaminophen


  (Tylenol)  650 mg  Q6H  PRN


 ORAL


 Mild Pain/Temp > 100.5  2/4/20 23:45


 3/5/20 23:44  2/5/20 07:43


 


 


 Aspirin


  (ASA)  325 mg  DAILY


 ORAL


   2/5/20 09:00


 3/6/20 08:59  2/6/20 08:27


 


 


 Clonidine HCl


  (Catapres Tab)  0.1 mg  Q4H  PRN


 ORAL


 SBP Greater than 160  2/5/20 00:00


 3/6/20 00:00   


 


 


 Dextrose


  (Dextrose 50%)  25 ml  Q30M  PRN


 IV


 Hypoglycemia  2/4/20 23:45


 3/5/20 23:44   


 


 


 Dextrose


  (Dextrose 50%)  50 ml  Q30M  PRN


 IV


 Hypoglycemia  2/4/20 23:45


 3/5/20 23:44   


 


 


 Diphenhydramine


 HCl


  (Benadryl)  25 mg  Q6H  PRN


 ORAL


 Itching  2/5/20 12:00


 3/6/20 11:59  2/5/20 21:09


 


 


 Fenofibrate


  (Tricor)  145 mg  DAILY


 ORAL


   2/6/20 09:00


 3/7/20 08:59  2/6/20 08:26


 


 


 Heparin Sodium


  (Porcine)


  (Heparin 5000


 units/ml)  5,000 units  EVERY 12  HOURS


 SUBQ


   2/5/20 09:00


 3/6/20 08:59  2/6/20 08:31


 


 


 Hydrochlorothiazide


  (Hydrodiuril)  25 mg  DAILY


 ORAL


   2/5/20 09:00


 3/6/20 08:59  2/6/20 08:26


 


 


 Insulin Aspart


  (NovoLOG)    BEFORE MEALS AND  HS


 SUBQ


   2/5/20 06:30


 3/6/20 06:29  2/6/20 11:28


 


 


 Lisinopril


  (PriniviL)  20 mg  DAILY


 ORAL


   2/4/20 09:00


 3/5/20 08:59  2/6/20 08:27


 


 


 Metformin HCl


  (Glucophage)  500 mg  BID@0630,1630


 ORAL


   2/5/20 06:30


 3/6/20 06:29  2/6/20 06:14


 


 


 Ondansetron HCl


  (Zofran)  4 mg  Q4H  PRN


 IVP


 Nausea & Vomiting  2/4/20 23:45


 3/5/20 23:44   


 


 


 Pravastatin Sodium


  (Pravachol)  20 mg  BEDTIME


 ORAL


   2/5/20 21:00


 3/6/20 20:59  2/5/20 21:02


 








Allergies:  


Coded Allergies:  


     No Known Allergies (Unverified , 2/4/20)


ROS Limited/Unobtainable:  No


Constitutional:  Reports: no symptoms


HEENT:  Reports: no symptoms


Cardiovascular:  Reports: no symptoms


Respiratory:  Reports: no symptoms


Gastrointestinal/Abdominal:  Reports: no symptoms


Genitourinary:  Reports: no symptoms


Neurologic/Psychiatric:  Reports: no symptoms


Subjective


50 YO F admitted with left side numbness and headache.  Now Hypertensive 

emergency.  Cover for Int [facility]-DR Whitney





Objective





Last Vital Signs








  Date Time  Temp Pulse Resp B/P (MAP) Pulse Ox O2 Delivery O2 Flow Rate FiO2


 


2/6/20 16:00 98.1 85 20 140/89 (106) 91   


 


2/6/20 09:00      Room Air  











Laboratory Tests








Test


  2/6/20


05:34


 


White Blood Count


  5.9 K/UL


(4.8-10.8)


 


Red Blood Count


  4.10 M/UL


(4.20-5.40)  L


 


Hemoglobin


  14.4 G/DL


(12.0-16.0)


 


Hematocrit


  37.5 %


(37.0-47.0)


 


Mean Corpuscular Volume 91 FL (80-99)  


 


Mean Corpuscular Hemoglobin


  35.1 PG


(27.0-31.0)  H


 


Mean Corpuscular Hemoglobin


Concent 38.4 G/DL


(32.0-36.0)  H


 


Red Cell Distribution Width


  11.5 %


(11.6-14.8)  L


 


Platelet Count


  205 K/UL


(150-450)


 


Mean Platelet Volume


  12.1 FL


(6.5-10.1)  H


 


Neutrophils (%) (Auto)


  49.3 %


(45.0-75.0)


 


Lymphocytes (%) (Auto)


  40.6 %


(20.0-45.0)


 


Monocytes (%) (Auto)


  7.1 %


(1.0-10.0)


 


Eosinophils (%) (Auto)


  1.2 %


(0.0-3.0)


 


Basophils (%) (Auto)


  1.8 %


(0.0-2.0)


 


Sodium Level


  136 MMOL/L


(136-145)


 


Potassium Level


  4.4 MMOL/L


(3.5-5.1)


 


Chloride Level


  100 MMOL/L


()


 


Carbon Dioxide Level


  30 MMOL/L


(21-32)


 


Anion Gap


  6 mmol/L


(5-15)


 


Blood Urea Nitrogen


  17 mg/dL


(7-18)


 


Creatinine


  0.8 MG/DL


(0.55-1.30)


 


Estimat Glomerular Filtration


Rate > 60 mL/min


(>60)


 


Glucose Level


  298 MG/DL


()  H


 


Hemoglobin A1c


  9.5 %


(4.3-6.0)  H


 


Calcium Level


  8.7 MG/DL


(8.5-10.1)


 


Magnesium Level


  1.7 MG/DL


(1.8-2.4)  L


 


Thyroid Stimulating Hormone


(TSH) 1.815 uiU/mL


(0.358-3.740)

















Intake and Output  


 


 2/5/20 2/6/20





 19:00 07:00


 


Intake Total 660 ml 360 ml


 


Output Total  2 ml


 


Balance 660 ml 358 ml


 


  


 


Intake Oral 660 ml 360 ml


 


Output Urine Total  2 ml


 


# Voids 2 6








Objective


PHYSICAL EXAMINATION:


GENERAL:  The patient is well-developed, well-nourished, 


female, in no apparent distress.


HEENT:  Eyes, pupils equal and responsive to light and accommodation.


Extraocular movements are intact.


NECK:  Supple without lymphadenopathy.


CHEST:  Lungs are clear to auscultation bilaterally without wheezes or


rales.


CARDIOVASCULAR:  Regular rate S1, S2 normal without murmurs, rubs, or


gallops.


ABDOMEN:  Soft, nontender, and nondistended.  Positive bowel sounds.  No


evidence of hepatosplenomegaly.  Currently, no rebound or guarding


noted.


EXTREMITIES:  Negative for clubbing, cyanosis, or edema.


RECTAL/GENITAL:  Not performed.


NEUROLOGIC:  Cranial nerves II through XII are grossly intact without focal


deficits.  Motor strength is 5/5 bilaterally.  Deep tendon reflexes are 2+


plantar.





Assessment/Plan


Assessment/Plan


ASSESSMENT:  This is a 49-year-old  female:





1. Left-sided numbness.


2. Hypertensive emergency.


3. Headache.


4. Blurred vision.


5. Diabetes type 2.


6. Hypertension.


7. Hypercholesterolemia.





TREATMENT:


1. Left-sided numbness/headache/blurred vision.  Initial CAT scan was


reported as no acute intracranial abnormalities.  An MRI of the brain=


non specific findings; no definite infarct.  Carotid duplex Dopplers are 

pending.  


A Neurology consultation has been obtained with Dr. Jose Ramírez.  


We will follow recommendations of Neurology.  Differential includes acute 


cerebrovascular accident versus transient ischemic attack.


2. Hypertensive emergency.  The patient has been placed on lisinopril


and hydrochlorothiazide as above.  Clonidine will be used p.r.n. for


systolic greater than 150, diastolic greater than 100.


3. Diabetes type 2.  Continue metformin as above.  NovoLog sliding scale


has been instituted for coverage.


4. Hypercholesterolemia.  Continue atorvastatin as above.











Phi Salas MD Feb 6, 2020 16:34

## 2020-02-06 NOTE — NUR
NURSE NOTES:

Received pt from [facility], Pt is awake and alert, pt is in RA, no SOB or acute respiratory 
distress noted. pt has intact iv access LFA 20G is running well. Pt is on continues heart 
monitoring, all needs attended, bed is locked and is in the lowest position, call light 
within easy reach. will continue to monitor.

-------------------------------------------------------------------------------

Addendum: 02/06/20 at 1243 by Perry Covarrubias RN

-------------------------------------------------------------------------------

ERROR

WRONG PT.

## 2020-02-06 NOTE — NUR
HAND-OFF: 

Report given to TOYA Amador. Patient remians in stable condition. Plan of care endorsed.

## 2020-02-06 NOTE — NUR
NURSE NOTES:

pt refused C spine MRI x3 attempts, explained risks and benefits but pt asked for tomorrow, 
pt took shower and tolerate well. will continue to monitor.

## 2020-02-07 VITALS — DIASTOLIC BLOOD PRESSURE: 98 MMHG | SYSTOLIC BLOOD PRESSURE: 144 MMHG

## 2020-02-07 VITALS — SYSTOLIC BLOOD PRESSURE: 118 MMHG | DIASTOLIC BLOOD PRESSURE: 84 MMHG

## 2020-02-07 VITALS — SYSTOLIC BLOOD PRESSURE: 118 MMHG | DIASTOLIC BLOOD PRESSURE: 94 MMHG

## 2020-02-07 VITALS — SYSTOLIC BLOOD PRESSURE: 135 MMHG | DIASTOLIC BLOOD PRESSURE: 90 MMHG

## 2020-02-07 VITALS — DIASTOLIC BLOOD PRESSURE: 78 MMHG | SYSTOLIC BLOOD PRESSURE: 120 MMHG

## 2020-02-07 VITALS — SYSTOLIC BLOOD PRESSURE: 112 MMHG | DIASTOLIC BLOOD PRESSURE: 72 MMHG

## 2020-02-07 LAB
ADD MANUAL DIFF: NO
BASOPHILS NFR BLD AUTO: 1.2 % (ref 0–2)
EOSINOPHIL NFR BLD AUTO: 1 % (ref 0–3)
ERYTHROCYTE [DISTWIDTH] IN BLOOD BY AUTOMATED COUNT: 11.3 % (ref 11.6–14.8)
HCT VFR BLD CALC: 39.6 % (ref 37–47)
HGB BLD-MCNC: 14.5 G/DL (ref 12–16)
LYMPHOCYTES NFR BLD AUTO: 39.5 % (ref 20–45)
MCV RBC AUTO: 91 FL (ref 80–99)
MONOCYTES NFR BLD AUTO: 7.7 % (ref 1–10)
NEUTROPHILS NFR BLD AUTO: 50.7 % (ref 45–75)
PLATELET # BLD: 190 K/UL (ref 150–450)
RBC # BLD AUTO: 4.37 M/UL (ref 4.2–5.4)
WBC # BLD AUTO: 6.9 K/UL (ref 4.8–10.8)

## 2020-02-07 RX ADMIN — HYDROCHLOROTHIAZIDE SCH MG: 50 TABLET ORAL at 09:04

## 2020-02-07 RX ADMIN — INSULIN DETEMIR SCH UNITS: 100 INJECTION, SOLUTION SUBCUTANEOUS at 12:09

## 2020-02-07 RX ADMIN — HEPARIN SODIUM SCH UNITS: 5000 INJECTION INTRAVENOUS; SUBCUTANEOUS at 21:00

## 2020-02-07 RX ADMIN — METFORMIN HYDROCHLORIDE SCH MG: 500 TABLET ORAL at 06:27

## 2020-02-07 RX ADMIN — LISINOPRIL SCH MG: 20 TABLET ORAL at 09:04

## 2020-02-07 RX ADMIN — INSULIN ASPART SCH UNITS: 100 INJECTION, SOLUTION INTRAVENOUS; SUBCUTANEOUS at 12:09

## 2020-02-07 RX ADMIN — INSULIN ASPART SCH UNITS: 100 INJECTION, SOLUTION INTRAVENOUS; SUBCUTANEOUS at 17:12

## 2020-02-07 RX ADMIN — HEPARIN SODIUM SCH UNITS: 5000 INJECTION INTRAVENOUS; SUBCUTANEOUS at 21:10

## 2020-02-07 RX ADMIN — MAGNESIUM OXIDE TAB 400 MG (241.3 MG ELEMENTAL MG) SCH MG: 400 (241.3 MG) TAB at 17:07

## 2020-02-07 RX ADMIN — INSULIN ASPART SCH UNITS: 100 INJECTION, SOLUTION INTRAVENOUS; SUBCUTANEOUS at 06:27

## 2020-02-07 RX ADMIN — INSULIN ASPART SCH UNITS: 100 INJECTION, SOLUTION INTRAVENOUS; SUBCUTANEOUS at 21:09

## 2020-02-07 RX ADMIN — MAGNESIUM OXIDE TAB 400 MG (241.3 MG ELEMENTAL MG) SCH MG: 400 (241.3 MG) TAB at 13:07

## 2020-02-07 RX ADMIN — METFORMIN HYDROCHLORIDE SCH MG: 500 TABLET ORAL at 17:07

## 2020-02-07 RX ADMIN — HEPARIN SODIUM SCH UNITS: 5000 INJECTION INTRAVENOUS; SUBCUTANEOUS at 09:09

## 2020-02-07 NOTE — NUR
CASE MANAGEMENT:REVIEW



2/7/20

SI: HYPERTENSION. DM

POSSIBLE CVA. BLURRED VISION. SLURRED SPEECH

98.7   86  20  144/98  96% ON RA

GLUCOSE+315



IS: TRICOR PO QD

HEPARIN SQ Q12

ASA PO QD

HCTZ PO QD

SS INSULIN AC+HS

METFORMIN PO BID   

LISINOPRIL PO QD

**: TELEMETRY STATUS

DCP: HOME



PLAN:

MRI BRAIN

MRI SPINE

## 2020-02-07 NOTE — NUR
NURSE NOTES:

Received report from TOYA Amador. Patient is in bed, awake, alert, and responsive. Breathing 
regular and unlabored with no S/S of SOB noted. Patient denies any pain or discomfort at 
this time. Bed is in lowest position, breaks engaged, and call light is within reach at all 
times. All other needs attended to, patient remains stable, will continue to monitor.

## 2020-02-07 NOTE — NUR
HAND-OFF: 



Report given to TOYA Amador. Patient is awake lying semi-hernández's; resting comfortably. In 
stable condition.

## 2020-02-07 NOTE — DIAGNOSTIC IMAGING REPORT
Indication: Syncope

 

Technique: Study was performed in a 1.5 Mary Alice magnet. Gadolinium-enhanced MR

angiography of the extracranial portions of both carotid arteries performed from the

thoracic arch to the skull base. Maximum intensity projection images displayed in

different projections.

 

Comparison: None

 

Findings: Right carotid: No significant carotid stenosis is identified.

 

Left carotid: No significant stenosis is identified.

 

Vertebral arteries below the skull base appear unremarkable.

 

The aortic arch appears unremarkable. The origins of the left subclavian, left common

carotid and innominate arteries appear patent. Origin of the right common carotid

artery is patent.

 

IMPRESSION:

 

Negative evaluation of the extracranial carotid and vertebral arteries. No stenosis

identified.

## 2020-02-07 NOTE — INTERNAL MED PROGRESS NOTE
Subjective


Physician Name


Elmer Whitney


Attending Physician


Elmer Whitney MD





Current Medications








 Medications


  (Trade)  Dose


 Ordered  Sig/Darius


 Route


 PRN Reason  Start Time


 Stop Time Status Last Admin


Dose Admin


 


 Acetaminophen


  (Tylenol)  650 mg  Q6H  PRN


 ORAL


 Mild Pain/Temp > 100.5  2/4/20 23:45


 3/5/20 23:44  2/5/20 07:43


 


 


 Aspirin


  (ASA)  325 mg  DAILY


 ORAL


   2/8/20 09:00


 3/9/20 08:59   


 


 


 Clonidine HCl


  (Catapres Tab)  0.1 mg  Q4H  PRN


 ORAL


 SBP Greater than 160  2/5/20 00:00


 3/6/20 00:00   


 


 


 Clopidogrel


 Bisulfate


  (Plavix)  75 mg  DAILY


 ORAL


   2/8/20 09:00


 3/9/20 08:59   


 


 


 Dextrose


  (Dextrose 50%)  25 ml  Q30M  PRN


 IV


 Hypoglycemia  2/4/20 23:45


 3/5/20 23:44   


 


 


 Dextrose


  (Dextrose 50%)  50 ml  Q30M  PRN


 IV


 Hypoglycemia  2/4/20 23:45


 3/5/20 23:44   


 


 


 Diphenhydramine


 HCl


  (Benadryl)  25 mg  Q6H  PRN


 ORAL


 Itching  2/5/20 12:00


 3/6/20 11:59  2/6/20 20:31


 


 


 Fenofibrate


  (Tricor)  145 mg  DAILY


 ORAL


   2/6/20 09:00


 3/7/20 08:59  2/7/20 09:03


 


 


 Gadobutrol


  (Gadavist)  7.5 mmol  NOW  PRN


 IV


 Radiology Procedure  2/7/20 13:15


 2/10/20 13:14   


 


 


 Heparin Sodium


  (Porcine)


  (Heparin 5000


 units/ml)  5,000 units  EVERY 12  HOURS


 SUBQ


   2/5/20 09:00


 3/6/20 08:59  2/7/20 09:09


 


 


 Hydrochlorothiazide


  (Hydrodiuril)  25 mg  DAILY


 ORAL


   2/5/20 09:00


 3/6/20 08:59  2/7/20 09:04


 


 


 Insulin Aspart


  (NovoLOG)    BEFORE MEALS AND  HS


 SUBQ


   2/5/20 06:30


 3/6/20 06:29  2/7/20 12:09


 


 


 Insulin Detemir


  (Levemir)  13 units  DAILY


 SUBQ


   2/7/20 12:30


 3/8/20 12:29  2/7/20 12:09


 


 


 Lisinopril


  (PriniviL)  20 mg  DAILY


 ORAL


   2/4/20 09:00


 3/5/20 08:59  2/7/20 09:04


 


 


 Lorazepam


  (Ativan)  0.5 mg  ONCE  PRN


 ORAL


 30min prior to MRI  2/7/20 08:00


 2/7/20 20:00  2/7/20 09:04


 


 


 Magnesium Oxide


  (Mag-Ox 400mg)  400 mg  THREE TIMES A  DAY


 ORAL


   2/7/20 13:00


 3/8/20 12:59  2/7/20 13:07


 


 


 Metformin HCl


  (Glucophage)  500 mg  BID@0630,1630


 ORAL


   2/5/20 06:30


 3/6/20 06:29  2/7/20 06:27


 


 


 Ondansetron HCl


  (Zofran)  4 mg  Q4H  PRN


 IVP


 Nausea & Vomiting  2/4/20 23:45


 3/5/20 23:44   


 


 


 Pravastatin Sodium


  (Pravachol)  20 mg  BEDTIME


 ORAL


   2/5/20 21:00


 3/6/20 20:59  2/6/20 20:32


 








Allergies:  


Coded Allergies:  


     No Known Allergies (Unverified , 2/4/20)


Subjective


awake, alert, responsive, No CP or SOB.





Objective





Last Vital Signs








  Date Time  Temp Pulse Resp B/P (MAP) Pulse Ox O2 Delivery O2 Flow Rate FiO2


 


2/7/20 12:00 96.8 80 18 120/78 (92) 96   


 


2/7/20 09:00      Room Air  











Laboratory Tests








Test


  2/6/20


17:30 2/7/20


05:55


 


Sodium Level


  136 MMOL/L


(136-145) 


 


 


Potassium Level


  4.0 MMOL/L


(3.5-5.1) 


 


 


Chloride Level


  97 MMOL/L


()  L 


 


 


Carbon Dioxide Level


  31 MMOL/L


(21-32) 


 


 


Anion Gap


  8 mmol/L


(5-15) 


 


 


Blood Urea Nitrogen


  16 mg/dL


(7-18) 


 


 


Creatinine


  0.7 MG/DL


(0.55-1.30) 


 


 


Estimat Glomerular Filtration


Rate > 60 mL/min


(>60) 


 


 


Glucose Level


  315 MG/DL


()  H 


 


 


Calcium Level


  9.2 MG/DL


(8.5-10.1) 


 


 


White Blood Count


  


  6.9 K/UL


(4.8-10.8)


 


Red Blood Count


  


  4.37 M/UL


(4.20-5.40)


 


Hemoglobin


  


  14.5 G/DL


(12.0-16.0)


 


Hematocrit


  


  39.6 %


(37.0-47.0)


 


Mean Corpuscular Volume  91 FL (80-99)  


 


Mean Corpuscular Hemoglobin


  


  33.2 PG


(27.0-31.0)  H


 


Mean Corpuscular Hemoglobin


Concent 


  36.6 G/DL


(32.0-36.0)  H


 


Red Cell Distribution Width


  


  11.3 %


(11.6-14.8)  L


 


Platelet Count


  


  190 K/UL


(150-450)


 


Mean Platelet Volume


  


  12.4 FL


(6.5-10.1)  H


 


Neutrophils (%) (Auto)


  


  50.7 %


(45.0-75.0)


 


Lymphocytes (%) (Auto)


  


  39.5 %


(20.0-45.0)


 


Monocytes (%) (Auto)


  


  7.7 %


(1.0-10.0)


 


Eosinophils (%) (Auto)


  


  1.0 %


(0.0-3.0)


 


Basophils (%) (Auto)


  


  1.2 %


(0.0-2.0)


 


Magnesium Level


  


  1.4 MG/DL


(1.8-2.4)  L

















Intake and Output  


 


 2/6/20 2/7/20





 18:59 06:59


 


Intake Total 280 ml 


 


Output Total 1800 ml 


 


Balance -1520 ml 


 


  


 


Intake Oral 280 ml 


 


Output Urine Total 1800 ml 


 


# Voids 3 2








Objective


General: No acute distress, awake and alert


HEENT: NCAT, sclera anicteric, PERRL, EOMI.


Neck: Supple, no significant jugular venous distention, 


Lungs: Good inspiratory effort,  clear to auscultation bilaterally, no Wheeze 

or Rales.


Heart: Regular rate and rhythm, normal S1/S2, no murmurs.


Abdomen: soft, nontender, nondistended. Normoactive bowel sounds.


 / Rectal: Refused and deferred.


Extremities: No Cyanosis , clubbing or edema. 


Neuro: A&O x 3, Able to move all extremities


Skin: warm, no rashes or lesions


Psych: Normal mood and affect





Assessment/Plan


Assessment/Plan


1. Left-sided numbness possible TIA Vs. CVA Vs. Demyelinating disease .


2. Hypertensive emergency.


3. Headache.


4. Blurred vision.


5. Diabetes type 2.


6. Hypertension.


7. Hypercholesterolemia.





TREATMENT:


1. Left-sided numbness/headache/blurred vision. MRA brain and neck.


A Neurology consultation has been obtained with Dr. Jose Ramírez.  


We will follow recommendations of Neurology.  Differential includes acute 


cerebrovascular accident versus transient ischemic attack.


2. Hypertensive emergency.  The patient has been placed on lisinopril


and hydrochlorothiazide as above.  Clonidine will be used p.r.n. for


systolic greater than 150, diastolic greater than 100.


3. Diabetes type 2.  Continue metformin as above.  NovoLog sliding scale


has been instituted for coverage.


4. Hypercholesterolemia.  Continue atorvastatin as above.











Elmer Whitney MD Feb 7, 2020 15:37

## 2020-02-07 NOTE — DIAGNOSTIC IMAGING REPORT
Indication: Neck pain

 

Technique: MRI examination of the cervical spine was performed in a 1.5 Mary Alice magnet.

Sequences obtained include sagittal and axial T1 and T2 fast spin echo, and sagittal

STIR. 

 

Comparison: none

 

Findings: 

 

Alignment: Normal

 

Bone marrow signal: Normal.

 

Spinal cord: Cord compression, likely chronic at the levels of C3-4 C4-5. Central

intramedullary increased T2 signal consistent with myelomalacia at C4-5. See

discussion below.

 

Soft tissues: No paravertebral or paraspinous soft tissue collections or edema

identified.

 

C1-2: Unremarkable.

 

C2-3: Unremarkable.

 

C3-4, C4-5, C5-6: There is evidence of a large central posterior extradural mass

(C3-C6 ) likely representing large disc extrusion involving the 3 contiguous discs at

C3-4, C4-5 and C5-6. At C3-4, the epidural focus is isointense and signal to disc on

both T1 and T2-weighted images. At C3-4, the disc extrusion measures 6 mm AP and 7.4

mm transverse dimensions. There is moderate compression of the thecal sac and cord

resulting in stenosis of the central canal which has a residual thecal sac diameter

at its narrowest of about 4 mm at this level. In the craniocaudal dimension the

extruded disc extends as superior as the C2-3 disc level. At its inferior margin the

disc extrusion is contiguous with a C4-5 disc extrusion and also contiguous with a

C5-6 disc extrusion. Craniocaudally, the entire extradural mass extends to the C6-7

disc. There is mild narrowing of the C3-4 neural foramen bilaterally due to

uncovertebral/facet arthropathy.

 

At C4-5 there is moderate loss of disc height. There is evidence of a prominent disc

extrusion at this level measuring about 6 m AP. As stated previously, the disc

extrusion is contiguous extruded disc material from the level above. The inferior

margin of the C4-5 disc extrusion is better defined. There is moderate to severe

stenosis of the C4-5 neural foramen bilaterally due to uncovertebral/facet

arthropathy.

 

At C5-6 there is also evidence of a disc extrusion. AP dimension is about 3 to 4 mm.

There is loss of disc height. There is slight inferior extension of the disc

extrusion to about the level of the C6-7 disc. There is moderate stenosis of the C5-6

neural foramen bilaterally due to uncovertebral/facet arthropathy.

 

C6-7: There is no disc herniation at this level. The central canal is patent. The

lateral recesses are patent as are neural foramina.

 

C7-T1 widely patent central canal, lateral recess and neural foramen demonstrated.

Disc height is normal.

 

IMPRESSION:

 

Intermediate to low signal intensity extradural mass posterior to C3, C4, C5 and C6

likely on the basis of disc extrusions at C3-4, C4-5 and C5-6. Associated compression

of the spinal cord at C3-4 and C4-5 with resultant myelomalacia. The extruded disc

material appears relatively contiguous with the parent disc at each of these levels

and is difficult to delineate further on this study. Would also consider the

possibility of superimposed hypertrophy or calcification of the posterior

longitudinal ligament. Consider CT for evaluation.

 

Narrowing of the neural foramen at C3-4, C4-5 and C5-6 secondary to

uncovertebral/facet arthropathy.

## 2020-02-07 NOTE — NUR
HAND-OFF: 

Report given to [place]. Pt is awake and stable, endorsed to F/U for CERVICAL collar with 
CM.

## 2020-02-07 NOTE — PULMONOLOGY PROGRESS NOTE
Assessment/Plan


Problems:  


(1) Cerebral vascular accident


(2) Hypertension


(3) DM (diabetes mellitus)


Assessment/Plan


neuro evaluation requested, Dr Ramírez was texted


MRI of brain reviewed: small vessel disease or demyelinating disease


monitor BP


sliding scale


diabetic diet


add levemir. Les was asked to see her.





Subjective


ROS Limited/Unobtainable:  No


Allergies:  


Coded Allergies:  


     No Known Allergies (Unverified , 2/4/20)





Objective





Last 24 Hour Vital Signs








  Date Time  Temp Pulse Resp B/P (MAP) Pulse Ox O2 Delivery O2 Flow Rate FiO2


 


2/7/20 09:04    144/98    


 


2/7/20 09:00      Room Air  


 


2/7/20 07:56 98.7 86 20 144/98 (113) 96   


 


2/7/20 07:44  82      


 


2/7/20 04:00 97.8 79 18 112/72 (85) 97   


 


2/7/20 04:00  82      


 


2/7/20 00:00 98.2 89 16 135/90 (105) 98   


 


2/7/20 00:00  79      


 


2/6/20 21:00      Room Air  


 


2/6/20 20:00 98.2 96 16 150/94 (112) 96   


 


2/6/20 20:00  82      


 


2/6/20 16:40  91      


 


2/6/20 16:00 98.1 85 20 140/89 (106) 91   


 


2/6/20 12:00 98.7 61 18 140/93 (109) 96   


 


2/6/20 11:46  80      

















Intake and Output  


 


 2/6/20 2/7/20





 18:59 06:59


 


Intake Total 280 ml 


 


Output Total 1800 ml 


 


Balance -1520 ml 


 


  


 


Intake Oral 280 ml 


 


Output Urine Total 1800 ml 


 


# Voids 3 2








General Appearance:  WD/WN


HEENT:  normocephalic, atraumatic


Respiratory/Chest:  chest wall non-tender, lungs clear


Breasts:  no masses


Cardiovascular:  normal peripheral pulses


Abdomen:  normal bowel sounds, soft, non tender


Genitourinary:  normal external genitalia


Extremities:  no cyanosis


Skin:  no rash


Neurologic/Psychiatric:  CNs II-XII grossly normal


Laboratory Tests


2/6/20 17:30: 


Sodium Level 136, Potassium Level 4.0, Chloride Level 97L, Carbon Dioxide Level 

31, Anion Gap 8, Blood Urea Nitrogen 16, Creatinine 0.7, Estimat Glomerular 

Filtration Rate > 60, Glucose Level 315H, Calcium Level 9.2


2/7/20 05:55: 


White Blood Count 6.9, Red Blood Count 4.37, Hemoglobin 14.5, Hematocrit 39.6, 

Mean Corpuscular Volume 91, Mean Corpuscular Hemoglobin 33.2H, Mean Corpuscular 

Hemoglobin Concent 36.6H, Red Cell Distribution Width 11.3L, Platelet Count 190

, Mean Platelet Volume 12.4H, Neutrophils (%) (Auto) 50.7, Lymphocytes (%) (Auto

) 39.5, Monocytes (%) (Auto) 7.7, Eosinophils (%) (Auto) 1.0, Basophils (%) (

Auto) 1.2, Magnesium Level 1.4L





Current Medications








 Medications


  (Trade)  Dose


 Ordered  Sig/Darius


 Route


 PRN Reason  Start Time


 Stop Time Status Last Admin


Dose Admin


 


 Acetaminophen


  (Tylenol)  650 mg  Q6H  PRN


 ORAL


 Mild Pain/Temp > 100.5  2/4/20 23:45


 3/5/20 23:44  2/5/20 07:43


 


 


 Aspirin


  (ASA)  325 mg  DAILY


 ORAL


   2/5/20 09:00


 3/6/20 08:59  2/7/20 09:04


 


 


 Clonidine HCl


  (Catapres Tab)  0.1 mg  Q4H  PRN


 ORAL


 SBP Greater than 160  2/5/20 00:00


 3/6/20 00:00   


 


 


 Dextrose


  (Dextrose 50%)  25 ml  Q30M  PRN


 IV


 Hypoglycemia  2/4/20 23:45


 3/5/20 23:44   


 


 


 Dextrose


  (Dextrose 50%)  50 ml  Q30M  PRN


 IV


 Hypoglycemia  2/4/20 23:45


 3/5/20 23:44   


 


 


 Diphenhydramine


 HCl


  (Benadryl)  25 mg  Q6H  PRN


 ORAL


 Itching  2/5/20 12:00


 3/6/20 11:59  2/6/20 20:31


 


 


 Fenofibrate


  (Tricor)  145 mg  DAILY


 ORAL


   2/6/20 09:00


 3/7/20 08:59  2/7/20 09:03


 


 


 Heparin Sodium


  (Porcine)


  (Heparin 5000


 units/ml)  5,000 units  EVERY 12  HOURS


 SUBQ


   2/5/20 09:00


 3/6/20 08:59  2/7/20 09:09


 


 


 Hydrochlorothiazide


  (Hydrodiuril)  25 mg  DAILY


 ORAL


   2/5/20 09:00


 3/6/20 08:59  2/7/20 09:04


 


 


 Insulin Aspart


  (NovoLOG)    BEFORE MEALS AND  HS


 SUBQ


   2/5/20 06:30


 3/6/20 06:29  2/7/20 06:27


 


 


 Lisinopril


  (PriniviL)  20 mg  DAILY


 ORAL


   2/4/20 09:00


 3/5/20 08:59  2/7/20 09:04


 


 


 Lorazepam


  (Ativan)  0.5 mg  ONCE  PRN


 ORAL


 30min prior to MRI  2/7/20 08:00


 2/7/20 20:00  2/7/20 09:04


 


 


 Metformin HCl


  (Glucophage)  500 mg  BID@0630,1630


 ORAL


   2/5/20 06:30


 3/6/20 06:29  2/7/20 06:27


 


 


 Ondansetron HCl


  (Zofran)  4 mg  Q4H  PRN


 IVP


 Nausea & Vomiting  2/4/20 23:45


 3/5/20 23:44   


 


 


 Pravastatin Sodium


  (Pravachol)  20 mg  BEDTIME


 ORAL


   2/5/20 21:00


 3/6/20 20:59  2/6/20 20:32


 

















Wiliam Lo MD Feb 7, 2020 11:18

## 2020-02-07 NOTE — DIAGNOSTIC IMAGING REPORT
Indication:  Syncope. Left eye blurry vision. Numbness in the left hand

 

Technique: 3-D time-of-flight of the brain

 

Comparison:  None

 

Findings: No significant stenosis, vascular malformation, or aneurysm is identified.

Flow-related enhancement of the major intracranial arteries demonstrated including

the anterior, middle, and posterior cerebral arteries.

 

 

Impression: Negative MRA of the brain

## 2020-02-07 NOTE — NUR
NURSE NOTES:

Received pt from HANNY PITTMAN, Pt is awake and alert, pt is in RA, no SOB or acute respiratory 
distress noted. Pt is on continues heart monitoring. no complain of pain at this moment. pt 
has intact iv access LAC 20G SL. Pt is eating breakfast with observation, all needs 
attended, bed is locked and is in the lowest position, call light within easy reach. will 
continue to monitor.

## 2020-02-07 NOTE — NUR
NURSE NOTES:



Patient is asleep lying semi-hernández's; resting comfortably. No signs of acute distress or 
pain noted at this time.

## 2020-02-07 NOTE — NUR
NURSE NOTES:



Called Dr. Lo regarding patient's Magnesium level of 1.7 yesterday, but was not 
covered. Awaiting callback for any further orders.

## 2020-02-08 VITALS — DIASTOLIC BLOOD PRESSURE: 69 MMHG | SYSTOLIC BLOOD PRESSURE: 108 MMHG

## 2020-02-08 VITALS — DIASTOLIC BLOOD PRESSURE: 89 MMHG | SYSTOLIC BLOOD PRESSURE: 120 MMHG

## 2020-02-08 VITALS — SYSTOLIC BLOOD PRESSURE: 108 MMHG | DIASTOLIC BLOOD PRESSURE: 79 MMHG

## 2020-02-08 VITALS — DIASTOLIC BLOOD PRESSURE: 78 MMHG | SYSTOLIC BLOOD PRESSURE: 113 MMHG

## 2020-02-08 LAB
ADD MANUAL DIFF: NO
ALBUMIN SERPL-MCNC: 3.7 G/DL (ref 3.4–5)
ALBUMIN/GLOB SERPL: 0.9 {RATIO} (ref 1–2.7)
ALP SERPL-CCNC: 71 U/L (ref 46–116)
ALT SERPL-CCNC: 25 U/L (ref 12–78)
ANION GAP SERPL CALC-SCNC: 8 MMOL/L (ref 5–15)
AST SERPL-CCNC: 14 U/L (ref 15–37)
BASOPHILS NFR BLD AUTO: 1 % (ref 0–2)
BILIRUB SERPL-MCNC: 0.8 MG/DL (ref 0.2–1)
BUN SERPL-MCNC: 13 MG/DL (ref 7–18)
CALCIUM SERPL-MCNC: 9.8 MG/DL (ref 8.5–10.1)
CHLORIDE SERPL-SCNC: 97 MMOL/L (ref 98–107)
CO2 SERPL-SCNC: 30 MMOL/L (ref 21–32)
CREAT SERPL-MCNC: 1 MG/DL (ref 0.55–1.3)
EOSINOPHIL NFR BLD AUTO: 1.2 % (ref 0–3)
ERYTHROCYTE [DISTWIDTH] IN BLOOD BY AUTOMATED COUNT: 11.3 % (ref 11.6–14.8)
GLOBULIN SER-MCNC: 4.1 G/DL
HCT VFR BLD CALC: 41.2 % (ref 37–47)
HGB BLD-MCNC: 14.5 G/DL (ref 12–16)
LYMPHOCYTES NFR BLD AUTO: 30.3 % (ref 20–45)
MCV RBC AUTO: 92 FL (ref 80–99)
MONOCYTES NFR BLD AUTO: 9.4 % (ref 1–10)
NEUTROPHILS NFR BLD AUTO: 58.2 % (ref 45–75)
PHOSPHATE SERPL-MCNC: 4.7 MG/DL (ref 2.5–4.9)
PLATELET # BLD: 204 K/UL (ref 150–450)
POTASSIUM SERPL-SCNC: 4 MMOL/L (ref 3.5–5.1)
RBC # BLD AUTO: 4.5 M/UL (ref 4.2–5.4)
SODIUM SERPL-SCNC: 135 MMOL/L (ref 136–145)
WBC # BLD AUTO: 7.7 K/UL (ref 4.8–10.8)

## 2020-02-08 RX ADMIN — METFORMIN HYDROCHLORIDE SCH MG: 500 TABLET ORAL at 06:36

## 2020-02-08 RX ADMIN — INSULIN ASPART SCH UNITS: 100 INJECTION, SOLUTION INTRAVENOUS; SUBCUTANEOUS at 11:30

## 2020-02-08 RX ADMIN — LISINOPRIL SCH MG: 20 TABLET ORAL at 09:21

## 2020-02-08 RX ADMIN — INSULIN ASPART SCH UNITS: 100 INJECTION, SOLUTION INTRAVENOUS; SUBCUTANEOUS at 06:38

## 2020-02-08 RX ADMIN — MAGNESIUM OXIDE TAB 400 MG (241.3 MG ELEMENTAL MG) SCH MG: 400 (241.3 MG) TAB at 12:04

## 2020-02-08 RX ADMIN — INSULIN DETEMIR SCH UNITS: 100 INJECTION, SOLUTION SUBCUTANEOUS at 09:26

## 2020-02-08 RX ADMIN — HYDROCHLOROTHIAZIDE SCH MG: 50 TABLET ORAL at 09:22

## 2020-02-08 RX ADMIN — MAGNESIUM OXIDE TAB 400 MG (241.3 MG ELEMENTAL MG) SCH MG: 400 (241.3 MG) TAB at 09:22

## 2020-02-08 NOTE — NUR
NURSE NOTES:

Discussed with SHORTY Squires mag 1.6, pt is already taking mag oxide 400mg PO TID, no new orders 
given

## 2020-02-08 NOTE — CONSULTATION
DATE OF CONSULTATION:  2020

ENDOCRINOLOGY CONSULTATION



CONSULTING PHYSICIAN:  Winston Almaraz M.D.



REFERRING PHYSICIAN:  Elmer Whitney M.D.



REASON FOR CONSULTATION:  Diabetes management.



HISTORY OF PRESENT ILLNESS:  The patient is a 49-year-old female, 

American with history of diabetes for the past 3 years, on metformin,

monotherapy as an outpatient,who presented

to the hospital with accelerated hypertension.  She works at a skilled

nursing facility.  Blood pressure was 200/115.  Initially went to [facility] Emergency Room where CT of the brain did not show any evidence of

intracranial abnormalities and then transferred to [facility] for

further treatment.  I was called to manage diabetes.



PAST MEDICAL HISTORY:

1. Type 2 diabetes as above.

2. Hypertension.

3. Dyslipidemia.



PAST SURGICAL HISTORY:

1. Hysterectomy.

2.  twice.



MEDICATIONS AS AN OUTPATIENT:

1. Metformin 1000 mg b.i.d.

2. Lisinopril and hydrochlorothiazide 20/25 daily.





ALLERGIES TO MEDICATIONS:  None.



SOCIAL HISTORY:  The patient is single, lives alone.  She smokes 1/4 of a

pack of cigarettes a day.  No alcohol or drug use.



REVIEW OF SYSTEMS:  As per HPI.



LABORATORY VALUES:  Sodium 135, potassium 4, chloride 97, bicarbonate 30,

BUN 13, creatinine 1.0, glucose 218, hemoglobin A1c of 9.5.  TSH is 1.8.



PHYSICAL EXAMINATION:

VITAL SIGNS:  Blood pressure 108/79, pulse 73, temperature 98.2, and

respiratory rate of 20.

HEENT:  Pupils are equal and reactive to light.  Sclerae anicteric.

NECK:  No JVD.  No thyromegaly.  No bruits.

LUNGS:  Clear.

HEART:  Regular rate and rhythm.

ABDOMEN:  Positive bowel sounds.  Soft.

EXTREMITIES:  No clubbing, cyanosis, or edema.



DIAGNOSES:

1. Accelerated hypertension.

2. Diabetes, out of control.



DISCUSSION:

1. Discontinue Levemir.

2. Increase metformin to 1000 mg b.i.d.

3. Add Januvia 100 mg daily.

4. Add glimepiride 2 mg daily.

5. Continue NovoLog sliding scale before meals and at bedtime.

6. No need for insulin after discharge.

7. Glucose monitoring before meals and bedtime after discharge.

8. Hemoglobin A1c to be repeated in three months.



Thank you, Dr. Whitney, for the courtesy of this consultation.









  ______________________________________________

  Winston Almaraz M.D.





DR:  RN/MK

D:  2020 13:47

T:  2020 21:40

JOB#:  [id number]

CC:



DIYA

## 2020-02-08 NOTE — NUR
NURSE NOTES:

Received report from TOYA Mcgrath. Pt in bed,awake, talkative, a/ox4, eating breakfast, no 
c/o pain, no apparent distress noted, discussed plan of care with pt and RN will call CM 
regarding cervical spine collar, bed in lowest  position, call light within reach.

## 2020-02-08 NOTE — NUR
NURSE NOTES:

Obtained soft cervical collar for pt and placed around pt's neck properly. Updated pt on 
plan of care, pt needs to be seen by a neurosurgeon. SHORTY Uribe contacted Dr. Salas to obtain 
consult/referral, RN will f/u with Dr. Salas during rounds. RN will provide pt with education 
on myelomalcia and review with pt

## 2020-02-08 NOTE — NUR
NURSE NOTES:

Pt discharged home with all belongings. RN provided written and detailed instruction 
regarding discharge and to follow up with primary MD and seek a neurosurgeon consult for 
compressed C3-C5 myelomalacia, provided and reviewed educational material regarding all 
medications including uses, dose, side effects, frequency, route, purpose. Pt was also 
provided written material about health eating in order to reduce triglycerides, pt was 
accompanied by sisterJune, pt is ambulatory, IV removed intact, ID band removed, tele 
box returned, pt stable for discharge

## 2020-02-08 NOTE — INTERNAL MED PROGRESS NOTE
Subjective


Date of Service:  Feb 8, 2020


Physician Name


MaritzaPhi


Attending Physician


Elmer Whitney MD





Current Medications








 Medications


  (Trade)  Dose


 Ordered  Sig/Darius


 Route


 PRN Reason  Start Time


 Stop Time Status Last Admin


Dose Admin


 


 Acetaminophen


  (Tylenol)  650 mg  Q6H  PRN


 ORAL


 Mild Pain/Temp > 100.5  2/4/20 23:45


 3/5/20 23:44  2/7/20 17:10


 


 


 Aspirin


  (ASA)  325 mg  DAILY


 ORAL


   2/8/20 09:00


 3/9/20 08:59  2/8/20 09:21


 


 


 Clonidine HCl


  (Catapres Tab)  0.1 mg  Q4H  PRN


 ORAL


 SBP Greater than 160  2/5/20 00:00


 3/6/20 00:00   


 


 


 Clopidogrel


 Bisulfate


  (Plavix)  75 mg  DAILY


 ORAL


   2/8/20 09:00


 3/9/20 08:59  2/8/20 09:22


 


 


 Dextrose


  (Dextrose 50%)  25 ml  Q30M  PRN


 IV


 Hypoglycemia  2/4/20 23:45


 3/5/20 23:44   


 


 


 Dextrose


  (Dextrose 50%)  50 ml  Q30M  PRN


 IV


 Hypoglycemia  2/4/20 23:45


 3/5/20 23:44   


 


 


 Diphenhydramine


 HCl


  (Benadryl)  25 mg  Q6H  PRN


 ORAL


 Itching  2/5/20 12:00


 3/6/20 11:59  2/6/20 20:31


 


 


 Fenofibrate


  (Tricor)  145 mg  DAILY


 ORAL


   2/6/20 09:00


 3/7/20 08:59  2/8/20 09:22


 


 


 Gadobutrol


  (Gadavist)  7.5 mmol  NOW  PRN


 IV


 Radiology Procedure  2/7/20 13:15


 2/10/20 13:14   


 


 


 Glimepiride


  (AmaryL)  2 mg  ACBREAKFAST


 ORAL


   2/9/20 06:30


 3/10/20 06:29   


 


 


 Heparin Sodium


  (Porcine)


  (Heparin 5000


 units/ml)  5,000 units  EVERY 12  HOURS


 SUBQ


   2/5/20 09:00


 3/6/20 08:59  2/7/20 09:09


 


 


 Hydrochlorothiazide


  (Hydrodiuril)  25 mg  DAILY


 ORAL


   2/5/20 09:00


 3/6/20 08:59  2/8/20 09:22


 


 


 Insulin Aspart


  (NovoLOG)    BEFORE MEALS AND  HS


 SUBQ


   2/5/20 06:30


 3/6/20 06:29  2/8/20 11:30


 


 


 Lisinopril


  (PriniviL)  20 mg  DAILY


 ORAL


   2/4/20 09:00


 3/5/20 08:59  2/8/20 09:21


 


 


 Magnesium Oxide


  (Mag-Ox 400mg)  400 mg  THREE TIMES A  DAY


 ORAL


   2/7/20 13:00


 3/8/20 12:59  2/8/20 12:04


 


 


 Metformin HCl


  (Glucophage)  1,000 mg  BID@0630,1630


 ORAL


   2/8/20 16:30


 3/6/20 06:29   


 


 


 Ondansetron HCl


  (Zofran)  4 mg  Q4H  PRN


 IVP


 Nausea & Vomiting  2/4/20 23:45


 3/5/20 23:44   


 


 


 Pravastatin Sodium


  (Pravachol)  20 mg  BEDTIME


 ORAL


   2/5/20 21:00


 3/6/20 20:59  2/7/20 21:08


 


 


 Sitagliptin


 Phosphate


  (Januvia)  100 mg  ACBREAKFAST


 ORAL


   2/9/20 06:30


 3/10/20 06:29   


 


 


 Sitagliptin


 Phosphate


  (Januvia)  100 mg  ONCE


 ORAL


   2/8/20 15:00


 2/8/20 16:00   


 








Allergies:  


Coded Allergies:  


     No Known Allergies (Unverified , 2/4/20)


ROS Limited/Unobtainable:  No


Constitutional:  Reports: no symptoms


HEENT:  Reports: no symptoms


Cardiovascular:  Reports: no symptoms


Respiratory:  Reports: no symptoms


Gastrointestinal/Abdominal:  Reports: no symptoms


Genitourinary:  Reports: no symptoms


Neurologic/Psychiatric:  Reports: no symptoms


Subjective


50 YO F admitted with left side numbness and headache.  Now Hypertensive 

emergency.  Cover for Int Pillo-DR Whitney





Objective





Last Vital Signs








  Date Time  Temp Pulse Resp B/P (MAP) Pulse Ox O2 Delivery O2 Flow Rate FiO2


 


2/8/20 12:00 98.2 73 20 108/79 (89) 97   


 


2/8/20 09:00      Room Air  











Laboratory Tests








Test


  2/8/20


05:50


 


White Blood Count


  7.7 K/UL


(4.8-10.8)


 


Red Blood Count


  4.50 M/UL


(4.20-5.40)


 


Hemoglobin


  14.5 G/DL


(12.0-16.0)


 


Hematocrit


  41.2 %


(37.0-47.0)


 


Mean Corpuscular Volume 92 FL (80-99)  


 


Mean Corpuscular Hemoglobin


  32.3 PG


(27.0-31.0)  H


 


Mean Corpuscular Hemoglobin


Concent 35.2 G/DL


(32.0-36.0)


 


Red Cell Distribution Width


  11.3 %


(11.6-14.8)  L


 


Platelet Count


  204 K/UL


(150-450)


 


Mean Platelet Volume


  10.8 FL


(6.5-10.1)  H


 


Neutrophils (%) (Auto)


  58.2 %


(45.0-75.0)


 


Lymphocytes (%) (Auto)


  30.3 %


(20.0-45.0)


 


Monocytes (%) (Auto)


  9.4 %


(1.0-10.0)


 


Eosinophils (%) (Auto)


  1.2 %


(0.0-3.0)


 


Basophils (%) (Auto)


  1.0 %


(0.0-2.0)


 


Erythrocyte Sedimentation Rate


  33 MM/HR


(0-20)  H


 


Sodium Level


  135 MMOL/L


(136-145)  L


 


Potassium Level


  4.0 MMOL/L


(3.5-5.1)


 


Chloride Level


  97 MMOL/L


()  L


 


Carbon Dioxide Level


  30 MMOL/L


(21-32)


 


Anion Gap


  8 mmol/L


(5-15)


 


Blood Urea Nitrogen


  13 mg/dL


(7-18)


 


Creatinine


  1.0 MG/DL


(0.55-1.30)


 


Estimat Glomerular Filtration


Rate > 60 mL/min


(>60)


 


Glucose Level


  218 MG/DL


()  H


 


Calcium Level


  9.8 MG/DL


(8.5-10.1)


 


Phosphorus Level


  4.7 MG/DL


(2.5-4.9)


 


Magnesium Level


  1.6 MG/DL


(1.8-2.4)  L


 


Total Bilirubin


  0.8 MG/DL


(0.2-1.0)


 


Aspartate Amino Transf


(AST/SGOT) 14 U/L (15-37)


L


 


Alanine Aminotransferase


(ALT/SGPT) 25 U/L (12-78)


 


 


Alkaline Phosphatase


  71 U/L


()


 


C-Reactive Protein,


Quantitative 1.1 mg/dL


(0.00-0.90)  H


 


Total Protein


  7.8 G/DL


(6.4-8.2)


 


Albumin


  3.7 G/DL


(3.4-5.0)


 


Globulin 4.1 g/dL  


 


Albumin/Globulin Ratio


  0.9 (1.0-2.7)


L

















Intake and Output  


 


 2/7/20 2/8/20





 19:00 07:00


 


Intake Total 140 ml 160 ml


 


Output Total 1600 ml 


 


Balance -1460 ml 160 ml


 


  


 


Intake Oral 140 ml 160 ml


 


Output Urine Total 1600 ml 


 


# Voids 3 1








Objective


PHYSICAL EXAMINATION:


GENERAL:  The patient is well-developed, well-nourished, 


female, in no apparent distress.


HEENT:  Eyes, pupils equal and responsive to light and accommodation.


Extraocular movements are intact.


NECK:  Supple without lymphadenopathy.


CHEST:  Lungs are clear to auscultation bilaterally without wheezes or


rales.


CARDIOVASCULAR:  Regular rate S1, S2 normal without murmurs, rubs, or


gallops.


ABDOMEN:  Soft, nontender, and nondistended.  Positive bowel sounds.  No


evidence of hepatosplenomegaly.  Currently, no rebound or guarding


noted.


EXTREMITIES:  Negative for clubbing, cyanosis, or edema.


RECTAL/GENITAL:  Not performed.


NEUROLOGIC:  Cranial nerves II through XII are grossly intact without focal


deficits.  Motor strength is 5/5 bilaterally.  Deep tendon reflexes are 2+


plantar.





Assessment/Plan


Assessment/Plan


ASSESSMENT:  This is a 49-year-old  female:





1. Left-sided numbness.


2. Hypertensive emergency.


3. Headache.


4. Blurred vision.


5. Diabetes type 2.


6. Hypertension.


7. Hypercholesterolemia.


8.  Cervical spine stenosis and HNP C-3 through C-6





TREATMENT:


1. Left-sided numbness/headache/blurred vision.  Initial CAT scan was


reported as no acute intracranial abnormalities.  An MRI of the brain=


non specific findings; no definite infarct.  Carotid duplex Dopplers are 

pending.  


A Neurology consultation has been obtained with Dr. Jose Ramírez.  


We will follow recommendations of Neurology. Will require neurosurgery consult.


Patient requests D/C home to Follow up with primary care physician for 


neurosurgery referral.  Patient has cervical collar at bedside


2. Hypertensive emergency.  The patient has been placed on lisinopril


and hydrochlorothiazide as above.  Clonidine will be used p.r.n. for


systolic greater than 150, diastolic greater than 100.


3. Diabetes type 2.  Continue metformin as above.  NovoLog sliding scale


has been instituted for coverage.


4. Hypercholesterolemia.  Continue atorvastatin as above.











Phi Salas MDb 8, 2020 14:23

## 2020-02-08 NOTE — NUR
RD ASSESSMENT & RECOMMENDATIONS

SEE CARE ACTIVITY FOR COMPLETE ASSESSMENT



DAILY ESTIMATED NEEDS:

Needs based on DM, cardiac/ 52kg abw  

25-30  kcals/kg 

2562-3137  total kcals

1-1.2  g protein/kg

52-62  g total protein 

25-30  mL/kg

8154-8090  total fluid mLs



NUTRITION DIAGNOSIS:

Altered nutrition related lab values R/T diabetes and altered lipid

metabolism as evidenced by elev A1C of 9.5 w/ elev POC glu ([phone]

233), elev triglyceride (840), elev total cholesterol (213).







RD consult received for diet education regarding elev triglyceride.

Pt aware of elev BGs and lipid panel levels. 

Currently on lipid lowering agents and Levemir + NISS + oral hypoglycemic for DM.

Reports has not been active recently, has been lazy, and not watching diet.

Diet education provided on cardiac diet as well as carb controlled diet. 

Reviewed carbs, carb counting, recommend carb servings w/ meals and in b/w meals, 

cardiac diet, examples of foods high in sodium, saturated and trans fat, healthy fats, 
reading nutrition facts labels, 

portion control, exercise and wt loss, etc. 

Pt asked many questions, verbalized good understanding of materials provided. 

Diet handouts provided as well. 





CURRENT DIET:CARDIAC, CCHO MED    



 



PO DIET RECOMMENDATIONS:

CARDIAC + CCHO LOW (Texture per SLP) 

 





ADDITIONAL RECOMMENDATIONS:

* Standing wt for accurate CBW 

* Diet education provided on cardiac + carb controlled diet     

     -> handouts provided as well 

* Monitor BGs closely, need to adjust insulin 

   : a1c 9.5, POC glu high 100's + 200's.  

* Monitor lytes, replete as needed: low mag

## 2020-02-08 NOTE — PULMONOLOGY PROGRESS NOTE
Assessment/Plan


Assessment/Plan


ASSESSMENT


1. L sided weakness ,  resolved, ?TIA vs   CVA   


2. HTN with initial HTN urgency 


3. DM


4. Mixed HLD with severely elevated TG 


5. Smoker    


6. Hypomagnesemia 





PLAN OF CARE 


tele 


CT head and MRI brain negative   


MRI of the brain with nonspecific findings, demyelinating disease not excludable


MRA of the brain negative


MRA of the neck with negative evaluation of the extracranial carotid and 

vertebral arteries, no stenosis head MRI/neck MRA


MRI cervical spine with associated compression of the spinal cord at C3-4 and C4

-5 with resultant myelomalacia , recommended CT for evaluation 


carotid Duplex unremarkable 


neuro eval done, but no consult in the EMR available 


cervical collar ordered , all neuro symptoms now resolved 


need neurosurgery eval 


hemoglobin A1c 9.4 


Levemir added for BS control 


started on oral magnesium supplements 


BP management with ACE and  HCTZ


lipid panel with severely elevated TG>800, started  pt on TriCor, monitor LFT , 

educated on diet


ASA, Plavix, statin


  on smoking cessation, declined Nicotine patch    





all symptoms resolved, pt wanted to go home


unfortunately no neurosurgeon service available at this facility


patient was counseled to make appointment with her PMD in 2-3 days with 

referral to neurosurgeon for further management


patient was also counseled if symptoms returned, go to a larger ER, like [facility] 

for further evaluation and management


patient verbalized understanding of dc instructions


in fact, she already made an appointment with PMD for the next week  





case discussed and evaluated by supervising physician





Subjective


Allergies:  


Coded Allergies:  


     No Known Allergies (Unverified , 2/4/20)


Subjective


neuro symptoms resolved


seen by neuro


all imaging reviewed





Objective





Last 24 Hour Vital Signs








  Date Time  Temp Pulse Resp B/P (MAP) Pulse Ox O2 Delivery O2 Flow Rate FiO2


 


2/8/20 09:21    113/78    


 


2/8/20 09:00      Room Air  


 


2/8/20 08:00 98.2 88 21 113/78 (90) 98   


 


2/8/20 04:00 98.0 65 19 108/65 (79) 97   


 


2/8/20 04:00  84      


 


2/8/20 00:00 98.0 99 20 120/89 (99) 96   


 


2/8/20 00:00  84      


 


2/7/20 21:00      Room Air  


 


2/7/20 20:00 98.1 103 20 118/94 (102) 96   


 


2/7/20 20:00  97      


 


2/7/20 16:00 98.1 100 18 118/84 (95) 97   


 


2/7/20 15:38  98      


 


2/7/20 12:00 96.8 80 18 120/78 (92) 96   


 


2/7/20 11:45  89      

















Intake and Output  


 


 2/7/20 2/8/20





 18:59 06:59


 


Intake Total 280 ml 160 ml


 


Output Total 1600 ml 


 


Balance -1320 ml 160 ml


 


  


 


Intake Oral 280 ml 160 ml


 


Output Urine Total 1600 ml 


 


# Voids 3 1








Objective


General Appearance:  WD/WN, no apparent distress, A/A/O x 4 


Lines, tubes and drains:  peripheral


HEENT:  normocephalic, atraumatic, anicteric, mucous membranes moist


Neck:  supple


Respiratory/Chest:  lungs clear, no respiratory distress, no accessory muscle 

use


Cardiovascular/Chest:  normal peripheral pulses, normal rate, regular rhythm


Abdomen:  normal bowel sounds, non tender, soft


Extremities:  no calf tenderness, normal capillary refill


Skin Exam:  warm/dry


Neurologic:  alert, normal mood/affect; motor strength now 5/5 BLE and BUE, no 

pronator drift 


Musculoskeletal:  normal muscle bulk


Laboratory Tests


2/8/20 05:50: 


White Blood Count 7.7, Red Blood Count 4.50, Hemoglobin 14.5, Hematocrit 41.2, 

Mean Corpuscular Volume 92, Mean Corpuscular Hemoglobin 32.3H, Mean Corpuscular 

Hemoglobin Concent 35.2, Red Cell Distribution Width 11.3L, Platelet Count 204, 

Mean Platelet Volume 10.8H, Neutrophils (%) (Auto) 58.2, Lymphocytes (%) (Auto) 

30.3, Monocytes (%) (Auto) 9.4, Eosinophils (%) (Auto) 1.2, Basophils (%) (Auto

) 1.0, Erythrocyte Sedimentation Rate 33H, Sodium Level 135L, Potassium Level 

4.0, Chloride Level 97L, Carbon Dioxide Level 30, Anion Gap 8, Blood Urea 

Nitrogen 13, Creatinine 1.0, Estimat Glomerular Filtration Rate > 60, Glucose 

Level 218H, Calcium Level 9.8, Phosphorus Level 4.7, Magnesium Level 1.6L, 

Total Bilirubin 0.8, Aspartate Amino Transf (AST/SGOT) 14L, Alanine 

Aminotransferase (ALT/SGPT) 25, Alkaline Phosphatase 71, C-Reactive Protein, 

Quantitative 1.1H, Total Protein 7.8, Albumin 3.7, Globulin 4.1, Albumin/

Globulin Ratio 0.9L





Current Medications








 Medications


  (Trade)  Dose


 Ordered  Sig/Darius


 Route


 PRN Reason  Start Time


 Stop Time Status Last Admin


Dose Admin


 


 Acetaminophen


  (Tylenol)  650 mg  Q6H  PRN


 ORAL


 Mild Pain/Temp > 100.5  2/4/20 23:45


 3/5/20 23:44  2/7/20 17:10


 


 


 Aspirin


  (ASA)  325 mg  DAILY


 ORAL


   2/8/20 09:00


 3/9/20 08:59  2/8/20 09:21


 


 


 Clonidine HCl


  (Catapres Tab)  0.1 mg  Q4H  PRN


 ORAL


 SBP Greater than 160  2/5/20 00:00


 3/6/20 00:00   


 


 


 Clopidogrel


 Bisulfate


  (Plavix)  75 mg  DAILY


 ORAL


   2/8/20 09:00


 3/9/20 08:59  2/8/20 09:22


 


 


 Dextrose


  (Dextrose 50%)  25 ml  Q30M  PRN


 IV


 Hypoglycemia  2/4/20 23:45


 3/5/20 23:44   


 


 


 Dextrose


  (Dextrose 50%)  50 ml  Q30M  PRN


 IV


 Hypoglycemia  2/4/20 23:45


 3/5/20 23:44   


 


 


 Diphenhydramine


 HCl


  (Benadryl)  25 mg  Q6H  PRN


 ORAL


 Itching  2/5/20 12:00


 3/6/20 11:59  2/6/20 20:31


 


 


 Fenofibrate


  (Tricor)  145 mg  DAILY


 ORAL


   2/6/20 09:00


 3/7/20 08:59  2/8/20 09:22


 


 


 Gadobutrol


  (Gadavist)  7.5 mmol  NOW  PRN


 IV


 Radiology Procedure  2/7/20 13:15


 2/10/20 13:14   


 


 


 Heparin Sodium


  (Porcine)


  (Heparin 5000


 units/ml)  5,000 units  EVERY 12  HOURS


 SUBQ


   2/5/20 09:00


 3/6/20 08:59  2/7/20 09:09


 


 


 Hydrochlorothiazide


  (Hydrodiuril)  25 mg  DAILY


 ORAL


   2/5/20 09:00


 3/6/20 08:59  2/8/20 09:22


 


 


 Insulin Aspart


  (NovoLOG)    BEFORE MEALS AND  HS


 SUBQ


   2/5/20 06:30


 3/6/20 06:29  2/8/20 06:38


 


 


 Insulin Detemir


  (Levemir)  13 units  DAILY


 SUBQ


   2/7/20 12:30


 3/8/20 12:29  2/8/20 09:26


 


 


 Lisinopril


  (PriniviL)  20 mg  DAILY


 ORAL


   2/4/20 09:00


 3/5/20 08:59  2/8/20 09:21


 


 


 Magnesium Oxide


  (Mag-Ox 400mg)  400 mg  THREE TIMES A  DAY


 ORAL


   2/7/20 13:00


 3/8/20 12:59  2/8/20 09:22


 


 


 Metformin HCl


  (Glucophage)  500 mg  BID@0630,1630


 ORAL


   2/5/20 06:30


 3/6/20 06:29  2/8/20 06:36


 


 


 Ondansetron HCl


  (Zofran)  4 mg  Q4H  PRN


 IVP


 Nausea & Vomiting  2/4/20 23:45


 3/5/20 23:44   


 


 


 Pravastatin Sodium


  (Pravachol)  20 mg  BEDTIME


 ORAL


   2/5/20 21:00


 3/6/20 20:59  2/7/20 21:08


 

















Tavia Uribe NP Feb 8, 2020 09:54

## 2020-02-10 NOTE — CONSULTATION
DATE OF CONSULTATION:  2020

NOTE:  POOR AUDIO



NEUROLOGICAL CONSULTATION



CONSULTING PHYSICIAN:  Jose Ramírez M.D.



CHIEF COMPLAINT:  This is the first [facility] admission for this

49-year-old right-handed  woman with a history of

hypertension, diabetes, and hyperlipidemia from 7 to 8 years ago.  Also,

she is a smoker, smoking 1 pack over a week and a half for at least last

20 years.  The patient was admitted with a chief complaint of left-sided

numbness, left-sided headache, and visual loss.  I was asked to see the

patient because of the symptoms.



HISTORY OF PRESENT ILLNESS:  The patient had a history of neuropathy in her

feet in  with pain and tingling.  She was given gabapentin at that

time and the neuropathy "disappeared."  She denies any previous history of

stroke.  She denies migraine headaches, but has had severe menstrual

headaches.  There is no history of _____.  She denies any history of

syphilis, gonorrhea, HIV, cancers, thyroid disease, or recent infections

except for sinus infection last year.



The patient has a history of tingling in the fingers, all of the fingers

of her right hand.  Especially if she extends her head backwards, it

causes tingling down her right arm to all the fingers.  Last month, she

saw Dr. Hollins and noted that the tingling went especially into her right

thumb.  The patient was diagnosed with a pinched nerve in her neck or

perhaps thoracic outlet syndrome.  She was given some exercises and

antibiotics because of the possibility of an infection.  If she sleeps on

the right side, she also gets tingling on the right side.



However, on Tuesday, the patient had sudden onset of blurred vision in

the left eye.  She thought the right eye was fine.  She had tingling in

her fingers and left-sided throbbing, moderately severe headache with

nausea and vomiting.  She also had paresthesias in her toes.  Paresthesias

in fingers lasted only about 24 hours.  Blurred vision lasted about 2

hours.  The headache lasted about 8 hours.  The patient went to work on

that day.  Her blood pressure was found to be 200/115 and she was then

sent to [facility] Emergency Room.  She had a CT scan of the brain,

which was alleged to be negative.  She was then transferred to this

hospital.  She said she had an MRI over at [place] as well.  The patient

was admitted for possibility of a stroke.  She had a brain MRI here on

2020.  There was some periventricular T2 hyperintense signals noted

and some chronic small vessel disease.  Demyelinating disease was "not

excludable."  There was no restricted diffusion or other abnormalities.

Duplex scan on 2020 revealed antegrade flow demonstrated within both

vertebral arteries.  No hemodynamically significant extracranial carotid

artery stenosis was identified.  There were mild heterogeneous plaques

demonstrated in both carotid arteries.  The patient on admission was

mildly anemic with a normal white count and normal platelets.  Chemistries

were basically normal except her blood sugars were high at 315 today and

285 to 298 in the days prior.  Her magnesium was slightly low.  Calcium

was normal.  BUN and creatinine were normal.  The patient had a 2D

echocardiogram on 2020.  The left ventricle was normal in size.  The

ejection fraction was 60 to 65%.  However, there was mild left ventricular

hypertrophy.  The other cardiac chamber sizes were normal.  She had some

mitral anulus and aortic root calcification.  There was reduced left

ventricular relaxation and mild left ventricular diastolic dysfunction

grade 1.  The right ventricular systolic pressure was 17.  Her EKG

revealed a prolonged QT interval, but then thought to be normal.  TSH was

normal.



The patient denies any chest pain, palpitation, shortness of breath, or

edema in the lower extremities.  There is no fever or chills.  She denies

any recent head or neck injuries.  She does not drink or take illegal

drugs.  She was on 81 mg of aspirin prior to this event.  She denies any

dizzy spells, hearing loss, tinnitus, tremors or shakes, dysarthria, or

dysphagia.  Both the patient's parents had strokes and the father has

dementia.



PAST MEDICAL HISTORY/PAST MEDICAL ILLNESSES:

1. See above.

2. Fibroid tumors with hysterectomy and bilateral oophorectomy in

.



ALLERGIES:  She has sinus disease.



SOCIAL HISTORY:  She is unmarried.  Has 2 children, in good health.



SURGERIES:  She had 2 C-sections and hysterectomy.



FAMILY HISTORY:  Her mother is  from congestive heart failure.  She

is a diabetic.  Has hypertension and had coronary artery disease.  A lot

of her siblings have diabetes type 2, one had ovarian cancer, one had

cirrhosis of the liver with liver transplant.  One brother  of "double

pneumonia."



MEDICATIONS:  Medications at home are pravastatin 20 mg, metformin 1000 mg,

lisinopril/hydrochlorothiazide 20/25 mg.



REVIEW OF SYSTEMS:  Her appetite fluctuates.  She is 146 pounds and 5 feet

1 inch tall.  Rest of the review of systems is noncontributory.



PHYSICAL EXAMINATION:

GENERAL:  She is a well-developed, well-nourished, overweight woman, lying

in bed, in no acute distress.

VITAL SIGNS:  Blood pressure is 120/78, temperature is 96.8 degrees, pulse

is 80 and regular, respiratory rate is 18.

HEENT:  Examination of the head reveals arcus senilis.

NECK:  There is no tenderness of the neck.  No limitation of motion,

however, there is right supraclavicular tenderness to palpation.  Carotids

are +2 without any bruits.

LUNGS:  Clear to auscultation.

CARDIOVASCULAR:  The JVP is flat.  The PMI was not felt.  The patient had a

normal S1 and S2 was physiologically split.  There is no S3, S4, murmurs,

or rubs appreciated.

ABDOMEN:  Soft and nontender.  Bowel sounds are active.  No organomegaly.

BACK:  There is no tenderness to percussion or muscle spasm.

EXTREMITIES:  Intact.

NEUROLOGIC EXAMINATION:

MENTAL STATUS:  Judgment, her judgment is normal.  She is alert and awake.

Affect, the affect is appropriate.  Memory, past memory is intact to her

date of birth 1970.  Immediate recall 3/3 objects.  Recent recall

was 2/3 objects in 5 minutes.  Intellect, similarities were abstract,

i.e., train and bicycle "forms of transportation."  However, she did not

know how a watch and ruler are the same.

CRANIAL NERVE EXAMINATION:

CRANIAL NERVE II:  Visual fields are intact to confrontation.  Fundi not

visualized.

CRANIAL NERVES III, IV, AND VI:  Extraocular motility was full.  Pupils

were equal, round, and reactive to light and 5 mm.

CRANIAL NERVE V:  Facial and corneal sensations were intact to fine touch.

Pterygoid strength 5/5.

CRANIAL NERVE VII:  Facial strength was 5/5 bilaterally.

CRANIAL NERVE VIII:  Auditory acuity was intact bilaterally to whisper.

CRANIAL NERVES IX AND X:  Gag was intact bilaterally.

CRANIAL NERVE XI:  Sternocleidomastoid strength was 5/5.  _____.

REFLEXES:  0 in the upper and lower extremities with downgoing toes and

testing for Babinski response.

COORDINATION:  Finger-nose-finger, heel-to-shin testing, and rapid

alternating movements are normal.

GAIT AND STATION:  She had a normal based gait.  Heel-toe tandem walk

normal.  Romberg is negative.

SENSORY:  Pinprick, fine touch, proprioception were all intact. _____.



IMPRESSION:  The patient probably has had a small lacunar stroke _____

complicated by headaches.  Given the risk factors, she probably had a

complicated stroke; however, no evidence of an acute stroke on the MRI

_____.  I will change her aspirin dose from 81 to 325 mg.  I would also

probably put the patient on Plavix 75 mg _____ her blood pressure _____

controlled.  Although her blood pressure is very high, it does not appear

that she has a hypertensive encephalopathy.  There is no evidence of

_____.



PLAN:

1. Aspirin 325 mg a day.

2. CT angiogram of the brain and neck.

3. _____.

4. CT of _____.

5. Continue control of blood pressure and the diabetes.



_____.  The numbness on the right hand radiating from the shoulders and

the change in position of her neck suggested possibility of a cervical

radiculopathy.  The exact area is unclear, possibly C7 or thoracic outlet

syndrome.



Thank you for this interesting case.









  ______________________________________________

  Jose Ramírez MD





DR:  REX

D:  2020 13:03

T:  2020 22:04

JOB#:  [id number]

CC:

## 2020-02-11 NOTE — DISCHARGE SUMMARY
Discharge Summary


Discharge Summary


_


DATE OF ADMISSION: 2/4/2020





DATE OF DISCHARGE: 2/9/2020








DISCHARGED BY: Dr. Whitney





REASON FOR ADMISSION: 


49 years old female with history of diabetes, hypertension, hyperlipidemia, 

initially presented to [facility] with sudden onset of left-sided numbness 

.


Blood pressure at that  time was significantly elevated with highest being 197/

129.  


Patient reported headache and blurred vision in the left eye . 


The symptoms resolved later .


No reported  chest pain  or  shortness of breath .


CT of the head , done  in the [facility] , failed to reveal any acute 

intracranial pathology.  


Patient subsequently was transferred to [facility] for insurance 

purposes.  


She initially reported numbness in the left upper extremity and then started to 

report right upper extremity tingling and discomfort in the neck area .


Troponin was negative . EKG revealed no acute ischemic changes.  


Patient reported smoking ,about a pack  in  the week , but no EtOH or alcohol 

abuse.  


Magnesium was 1.6.  


Lipid panel revealed elevated triglycerides> 800 and elevated total cholesterol 

> 200.  


On initial evaluation patient demonstrated significant  decrease in motor 

strength  on  the left side  left upper and left lower extremities  along with 

a left pronator drift.  


Patient subsequently admitted to telemetry floor for further management.


 


 


CONSULTANTS:


cardiologist


neurologist Dr. Ramírez


critical care Dr. Lo


endocrinologist Dr. Almaraz





 


 


[facility] COURSE: 


Patient admitted to telemetry floor.  


MRI of the brain was repeated and revealed periventricular T2 hyperintense 

signal.  


Findings were nonspecific.  


At this age consideration included chronic small vessel disease,  may be 

associated with the hypertension.  However, demyelinating disease was not 

excluded.  





MRA of the neck  revealed negative evaluation of the extracranial carotid and 

vertebral arteries. No stenosis was identified. 





Head MRA  did not revealed any  significant stenosis,  vascular malformation or 

aneurysm .





MRI of the cervical spine revealed intermediate to low signal intensity 

extradural mass posterior to C3, C4, C5 and C6, likely on the basis of disc 

extrusions at C3-4, C4-5 and C5-6. 


Associated compression of the spinal cord at C3-4 and C4-5 with resultant 

myelomalacia. 


The extruded disc material appeared relatively contiguous with the parent disc 

at each of these levels and is difficult to delineate further on this study. 


Also consider the  possibility of superimposed hypertrophy or calcification of 

the posterior longitudinal ligament. 


Consider CT for evaluation.


Narrowing of the neural foramen at C3-4, C4-5 and C5-6 secondary to vertebral/

facet arthropathy.





Carotid duplex was unremarkable.  





Neurologist  seen and evaluated patient.


Per neurologist,  patient probably had a small lacunar stroke,  complicated by 

headache.  


No evidence of acute stroke on the MRI.  


Patient started on aspirin, and  Plavix  later was added.  


Per neurologist,  the numbness of the right hand radiating from the shoulder 

and the change in the position of the neck suggested possibility of the 

cervical radiculopathy,  possibly C7 or thoracic outlet syndrome. 





Blood pressure was managed with ACE inhibitor and hydrochlorothiazide,  blood 

pressure improved.  


Statin continued .


Patient started on the TriCor for severely elevated triglycerides , monitor LFT 

.


Patient was educated on low-fat low-cholesterol diet.  


Patient started on oral magnesium supplement.  


Patient counseled on smoking cessation ; she declined nicotine patch.  


Cervical collar was provided as per neurologist recommendation.  


Unfortunately unable to get  a neurosurgery evaluation in this facility.  


All neuro symptoms per patient resolved,  and she wanted to go home.  


Physical exam prior to discharge demonstrated equal motor strength 5/5 

bilateral upper and lower extremities. 





Endocrinologist followed.  


Levemir was stopped.  


Oral anti-glycemic regimen was optimized as per endocrinologist; dose of 

metformin increased and Amaryl and Januvia added.  


Sliding scale of insulin was continued while in the hospital.  


No need for insulin upon discharge.  


Endocrinologist recommended    glucose monitoring before meals and at bedtime 

after discharge.  


Diabetic teaching and diabetic diet provided.  


Hemoglobin A1c 9.5 clearly not at goal.  


Patient was advised to comply with medications and diet, and check   hemoglobin 

A1c every 3 months.





Patient was counseled to make appointment with her primary care physician with  

a referral to neurosurgeon for further evaluation.  


Patient also counseled if symptoms return,  go to a  larger hospital . 


Patient verbalized understanding of the discharge instructions.








 


FINAL DIAGNOSES: 


Left-sided weakness : possible TIA versus CVA versus demyelinating disease 


Probably small lacunar infarct


Possible cervical radiculopathy 


Hypertension  with initial hypertensive urgency


Diabetes mellitus out-of-control


Mixed hyperlipidemia with severely elevated triglycerides


Smoker


Hypomagnesemia





DISCHARGE MEDICATIONS:


See Medication Reconciliation list.





DISCHARGE INSTRUCTIONS:


Patient was discharged home.


Patient to follow-up with a primary care provider next week as scheduled with 

referral to neurosurgeon.


Patient was advised if symptoms recur go to the largest emergency room for 

evaluation by neurosurgeon.











Tavia Uribe NP Feb 11, 2020 09:29

## 2020-02-12 NOTE — NUR
*-* INSURANCE *-*



ALL CLINICALS AND REVIEWS HAVE BEEN FAXED TO:



[employer]

TRK# [id number]

F: [phone]

-------------------------------------------------------------------------------

Addendum: 02/13/20 at 1704 by SULMA ROCA CM

-------------------------------------------------------------------------------

DISCHARGE SUMMARY HAS BEEN FAXED

## 2020-02-19 NOTE — NUR
*-* INSURANCE *-*



ALL CLINICALS AND REVIEWS HAVE BEEN FAXED TO:



[employer]

TRK# [id number]

F: [phone]